# Patient Record
Sex: FEMALE | Race: BLACK OR AFRICAN AMERICAN | URBAN - METROPOLITAN AREA
[De-identification: names, ages, dates, MRNs, and addresses within clinical notes are randomized per-mention and may not be internally consistent; named-entity substitution may affect disease eponyms.]

---

## 2017-10-17 NOTE — PROGRESS NOTES
SUBJECTIVE:   CC: Concha Castanon is an 42 year old woman who presents for preventive health visit.     Healthy Habits:  Answers for HPI/ROS submitted by the patient on 10/19/2017   Annual Exam:  Getting at least 3 servings of Calcium per day:: NO  Bi-annual eye exam:: NO  Dental care twice a year:: NO  Sleep apnea or symptoms of sleep apnea:: None  Medication side effects:: None  Additional concerns today:: YES  PHQ-2 Score: 0      Patient is here for a physical.  Not due for pap, pap is due next year.    Had mammo last year, was told to have one every 2 years.  Will get next year.   Has diabetes.  Uncontrolled currently because she ran out of medications due to lack of insurance. She saw an MD yesterday (see her chart for this note) who diagnosed her with a UTI (febrile) and gave her ciprofloxacin to take. She did not start it yet so I will give her a shot of rocephin in clinic and she will start the cipro today.  She is not vomiting but has low grade fever.  No flank pain.  Her blood sugar was high yesterday over 300 but that was before she started her insulin and metformin medications again.  She is picking up her glucose monitor today and will start monitoring.  She will f/u with her PCP in one month per their note.  She will go to the emergency room with worsening fevers, vomiting, or flank pain.  She states understanding.     No FH colon or breast cancer.     Mood has been good.     No concerns today.           Today's PHQ-2 Score:   PHQ-2 ( 1999 Pf[employer]) 10/19/2017   Q1: Little interest or pleasure in doing things 0   Q2: Feeling down, depressed or hopeless 0   PHQ-2 Score 0   Q1: Little interest or pleasure in doing things Not at all   Q2: Feeling down, depressed or hopeless Not at all   PHQ-2 Score 0         Abuse: Current or Past(Physical, Sexual or Emotional)- No  Do you feel safe in your environment - Yes  Social History   Substance Use Topics     Smoking status: Never Smoker     Smokeless tobacco:  Never Used     Alcohol use Yes      Comment: OCC      The patient does not drink >3 drinks per day nor >7 drinks per week.    Reviewed orders with patient.  Reviewed health maintenance and updated orders accordingly - Yes  BP Readings from Last 3 Encounters:   10/19/17 129/87   10/18/17 118/82    Wt Readings from Last 3 Encounters:   10/19/17 198 lb (89.8 kg)   10/18/17 198 lb (89.8 kg)                  There is no problem list on file for this patient.    Past Surgical History:   Procedure Laterality Date     GYN SURGERY      Laproscopy     VAG DELIV ONLY,PREV C-SECTN      2 c section       Social History   Substance Use Topics     Smoking status: Never Smoker     Smokeless tobacco: Never Used     Alcohol use Yes      Comment: OCC      Family History   Problem Relation Age of Onset     DIABETES Mother      DIABETES Father      Hypertension Father          Current Outpatient Prescriptions   Medication Sig Dispense Refill     IRON PO        cefTRIAXone (ROCEPHIN) 1 GM vial Inject 1 g (1,000 mg) into the muscle once for 1 dose 10 mL 0     metFORMIN (GLUCOPHAGE-XR) 500 MG 24 hr tablet Take 2 tablets (1,000 mg) by mouth 2 times daily (with meals) 180 tablet 0     insulin glargine (LANTUS SOLOSTAR) 100 UNIT/ML injection Inject 37 Units Subcutaneous At Bedtime 15 mL 1     ibuprofen (ADVIL/MOTRIN) 600 MG tablet Take 1 tablet (600 mg) by mouth every 8 hours as needed for moderate pain 15 tablet 1     blood glucose monitoring (NO BRAND SPECIFIED) test strip Check blood sugar x 3 daily 100 strip 1     blood glucose monitoring (NO BRAND SPECIFIED) meter device kit Check blood sugar x 3 daily 1 kit 0     blood glucose (NO BRAND SPECIFIED) lancets standard Check blood sugar x 3 daily 100 each 3     blood glucose calibration (ONETOUCH ULTRA CONTROL) solution Use to calibrate blood glucose monitor as directed. 1 Bottle 0     blood glucose monitoring (ONE TOUCH ULTRA 2) meter device kit Use to test blood sugars 3 times daily or as  "directed. 1 kit 0           Patient under age 50, mutual decision reflected in health maintenance.        Pertinent mammograms are reviewed under the imaging tab.  History of abnormal Pap smear: YES - other categories - see link Cervical Cytology Screening Guidelines      Reviewed and updated as needed this visit by clinical staffTobacco  Allergies  Meds  Med Hx  Surg Hx  Fam Hx  Soc Hx        Reviewed and updated as needed this visit by Provider        Past Medical History:   Diagnosis Date     Diabetes (H)      Uncomplicated asthma       Past Surgical History:   Procedure Laterality Date     GYN SURGERY      Laproscopy     VAG DELIV ONLY,PREV C-SECTN      2 c section     Obstetric History     No data available          ROS:  I: NEGATIVE for worrisome rashes, moles or lesions  E: NEGATIVE for vision changes or irritation  ENT: NEGATIVE for ear, mouth and throat problems  R: NEGATIVE for significant cough or SOB  B: NEGATIVE for masses, tenderness or discharge  CV: NEGATIVE for chest pain, palpitations or peripheral edema  M: NEGATIVE for significant arthralgias or myalgia  N: NEGATIVE for weakness, dizziness or paresthesias  P: NEGATIVE for changes in mood or affect    OBJECTIVE:   /87  Pulse 115  Temp 99.8  F (37.7  C) (Tympanic)  Ht 5' 6\" (1.676 m)  Wt 198 lb (89.8 kg)  LMP 09/19/2017 (Approximate)  SpO2 100%  Breastfeeding? No  BMI 31.96 kg/m2  EXAM:  GENERAL: alert, no distress and obese  EYES: Eyes grossly normal to inspection, PERRL and conjunctivae and sclerae normal  HENT: ear canals and TM's normal, nose and mouth without ulcers or lesions  NECK: no adenopathy, no asymmetry, masses, or scars and thyroid normal to palpation  RESP: lungs clear to auscultation - no rales, rhonchi or wheezes  BREAST: normal without masses, tenderness or nipple discharge and no palpable axillary masses or adenopathy  CV: regular rate and rhythm, normal S1 S2, no S3 or S4, no murmur, click or rub, no " "peripheral edema and peripheral pulses strong  ABDOMEN: soft, nontender, no hepatosplenomegaly, no masses and bowel sounds normal  MS: no gross musculoskeletal defects noted, no edema  SKIN: no suspicious lesions or rashes  NEURO: Normal strength and tone, mentation intact and speech normal  PSYCH: mentation appears normal, affect normal/bright    ASSESSMENT/PLAN:   1. Encounter for routine adult health examination with abnormal findings      2. Complicated UTI (urinary tract infection)  Take antibiotic with food. Side effects discussed.  Call with worsening symptoms or if no improvement in 1-2 days.  Discussed signs to watch for including worsening back pain, fevers, vomiting, or malaise.  They are to go to the emergency room if these occur or be seen again in clinic immediately.  Drink plenty of fluids.  We will follow up with results of culture and change antibiotic if needed.    She was monitored in clinic for 20 min after injection, she did not have a reaction.     - cefTRIAXone (ROCEPHIN) 1 GM vial; Inject 1 g (1,000 mg) into the muscle once for 1 dose  Dispense: 10 mL; Refill: 0  - ROCEPHIN 250 MG VIAL    COUNSELING:   Reviewed preventive health counseling, as reflected in patient instructions       Regular exercise       Healthy diet/nutrition       Vision screening       Hearing screening         reports that she has never smoked. She has never used smokeless tobacco.    Estimated body mass index is 31.96 kg/(m^2) as calculated from the following:    Height as of this encounter: 5' 6\" (1.676 m).    Weight as of this encounter: 198 lb (89.8 kg).   Weight management plan: Patient was referred to their PCP to discuss a diet and exercise plan.     Patient Instructions     Preventive Health Recommendations  Female Ages 40 to 49    Yearly exam:     See your health care provider every year in order to  1. Review health changes.   2. Discuss preventive care.    3. Review your medicines if your doctor prescribed " any.      Get a Pap test every three years (unless you have an abnormal result and your provider advises testing more often).      If you get Pap tests with HPV test, you only need to test every 5 years, unless you have an abnormal result. You do not need a Pap test if your uterus was removed (hysterectomy) and you have not had cancer.      You should be tested each year for STDs (sexually transmitted diseases), if you're at risk.       Ask your doctor if you should have a mammogram.      Have a colonoscopy (test for colon cancer) if someone in your family has had colon cancer or polyps before age 50.       Have a cholesterol test every 5 years.       Have a diabetes test (fasting glucose) after age 45. If you are at risk for diabetes, you should have this test every 3 years.    Shots: Get a flu shot each year. Get a tetanus shot every 10 years.     Nutrition:     Eat at least 5 servings of fruits and vegetables each day.    Eat whole-grain bread, whole-wheat pasta and brown rice instead of white grains and rice.    Talk to your provider about Calcium and Vitamin D.     Lifestyle    Exercise at least 150 minutes a week (an average of 30 minutes a day, 5 days a week). This will help you control your weight and prevent disease.    Limit alcohol to one drink per day.    No smoking.     Wear sunscreen to prevent skin cancer.    See your dentist every six months for an exam and cleaning.        Counseling Resources:  ATP IV Guidelines  Pooled Cohorts Equation Calculator  Breast Cancer Risk Calculator  FRAX Risk Assessment  ICSI Preventive Guidelines  Dietary Guidelines for Americans, 2010  USDA's MyPlate  ASA Prophylaxis  Lung CA Screening    Alondra Perez PA-C  [facility]

## 2017-10-18 ENCOUNTER — OFFICE VISIT (OUTPATIENT)
Dept: FAMILY MEDICINE | Facility: CLINIC | Age: 42
End: 2017-10-18
Payer: COMMERCIAL

## 2017-10-18 ENCOUNTER — TELEPHONE (OUTPATIENT)
Dept: FAMILY MEDICINE | Facility: CLINIC | Age: 42
End: 2017-10-18

## 2017-10-18 VITALS
SYSTOLIC BLOOD PRESSURE: 118 MMHG | OXYGEN SATURATION: 99 % | TEMPERATURE: 102.8 F | DIASTOLIC BLOOD PRESSURE: 82 MMHG | WEIGHT: 198 LBS | HEART RATE: 127 BPM

## 2017-10-18 DIAGNOSIS — R31.9 URINARY TRACT INFECTION WITH HEMATURIA, SITE UNSPECIFIED: ICD-10-CM

## 2017-10-18 DIAGNOSIS — R07.9 ACUTE CHEST PAIN: ICD-10-CM

## 2017-10-18 DIAGNOSIS — N39.0 URINARY TRACT INFECTION WITH HEMATURIA, SITE UNSPECIFIED: ICD-10-CM

## 2017-10-18 DIAGNOSIS — R53.83 FATIGUE, UNSPECIFIED TYPE: ICD-10-CM

## 2017-10-18 DIAGNOSIS — E11.65 TYPE 2 DIABETES MELLITUS WITH HYPERGLYCEMIA, WITH LONG-TERM CURRENT USE OF INSULIN (H): Primary | ICD-10-CM

## 2017-10-18 DIAGNOSIS — R82.90 NONSPECIFIC FINDING ON EXAMINATION OF URINE: ICD-10-CM

## 2017-10-18 DIAGNOSIS — R50.9 FEVER, UNSPECIFIED FEVER CAUSE: ICD-10-CM

## 2017-10-18 DIAGNOSIS — Z79.4 TYPE 2 DIABETES MELLITUS WITH HYPERGLYCEMIA, WITH LONG-TERM CURRENT USE OF INSULIN (H): Primary | ICD-10-CM

## 2017-10-18 LAB
ALBUMIN SERPL-MCNC: 3.4 G/DL (ref 3.4–5)
ALBUMIN UR-MCNC: ABNORMAL MG/DL
ALP SERPL-CCNC: 73 U/L (ref 40–150)
ALT SERPL W P-5'-P-CCNC: 18 U/L (ref 0–50)
ANION GAP SERPL CALCULATED.3IONS-SCNC: 13 MMOL/L (ref 3–14)
APPEARANCE UR: ABNORMAL
AST SERPL W P-5'-P-CCNC: 12 U/L (ref 0–45)
BACTERIA #/AREA URNS HPF: ABNORMAL /HPF
BASOPHILS # BLD AUTO: 0 10E9/L (ref 0–0.2)
BASOPHILS NFR BLD AUTO: 0.2 %
BILIRUB SERPL-MCNC: 0.4 MG/DL (ref 0.2–1.3)
BILIRUB UR QL STRIP: NEGATIVE
BUN SERPL-MCNC: 11 MG/DL (ref 7–30)
CALCIUM SERPL-MCNC: 8.9 MG/DL (ref 8.5–10.1)
CHLORIDE SERPL-SCNC: 96 MMOL/L (ref 94–109)
CO2 SERPL-SCNC: 20 MMOL/L (ref 20–32)
COLOR UR AUTO: YELLOW
CREAT SERPL-MCNC: 0.79 MG/DL (ref 0.52–1.04)
CREAT UR-MCNC: 74 MG/DL
DIFFERENTIAL METHOD BLD: NORMAL
EOSINOPHIL # BLD AUTO: 0 10E9/L (ref 0–0.7)
EOSINOPHIL NFR BLD AUTO: 0.5 %
ERYTHROCYTE [DISTWIDTH] IN BLOOD BY AUTOMATED COUNT: 11.5 % (ref 10–15)
GFR SERPL CREATININE-BSD FRML MDRD: 81 ML/MIN/1.7M2
GLUCOSE SERPL-MCNC: 325 MG/DL (ref 70–99)
GLUCOSE UR STRIP-MCNC: >=1000 MG/DL
HBA1C MFR BLD: 13.1 % (ref 4.3–6)
HCT VFR BLD AUTO: 35.1 % (ref 35–47)
HGB BLD-MCNC: 12.8 G/DL (ref 11.7–15.7)
HGB UR QL STRIP: ABNORMAL
KETONES UR STRIP-MCNC: 40 MG/DL
LDLC SERPL DIRECT ASSAY-MCNC: 79 MG/DL
LEUKOCYTE ESTERASE UR QL STRIP: ABNORMAL
LYMPHOCYTES # BLD AUTO: 1.2 10E9/L (ref 0.8–5.3)
LYMPHOCYTES NFR BLD AUTO: 19.6 %
MCH RBC QN AUTO: 30.5 PG (ref 26.5–33)
MCHC RBC AUTO-ENTMCNC: 36.5 G/DL (ref 31.5–36.5)
MCV RBC AUTO: 84 FL (ref 78–100)
MICROALBUMIN UR-MCNC: 91 MG/L
MICROALBUMIN/CREAT UR: 122.93 MG/G CR (ref 0–25)
MONOCYTES # BLD AUTO: 0.8 10E9/L (ref 0–1.3)
MONOCYTES NFR BLD AUTO: 12.5 %
NEUTROPHILS # BLD AUTO: 4.2 10E9/L (ref 1.6–8.3)
NEUTROPHILS NFR BLD AUTO: 67.2 %
NITRATE UR QL: POSITIVE
NON-SQ EPI CELLS #/AREA URNS LPF: ABNORMAL /LPF
PH UR STRIP: 6 PH (ref 5–7)
PLATELET # BLD AUTO: 216 10E9/L (ref 150–450)
POTASSIUM SERPL-SCNC: 3.9 MMOL/L (ref 3.4–5.3)
PROT SERPL-MCNC: 8.5 G/DL (ref 6.8–8.8)
RBC # BLD AUTO: 4.19 10E12/L (ref 3.8–5.2)
RBC #/AREA URNS AUTO: ABNORMAL /HPF
SODIUM SERPL-SCNC: 129 MMOL/L (ref 133–144)
SOURCE: ABNORMAL
SP GR UR STRIP: <=1.005 (ref 1–1.03)
UROBILINOGEN UR STRIP-ACNC: 0.2 EU/DL (ref 0.2–1)
WBC # BLD AUTO: 6.2 10E9/L (ref 4–11)
WBC #/AREA URNS AUTO: ABNORMAL /HPF

## 2017-10-18 PROCEDURE — 83036 HEMOGLOBIN GLYCOSYLATED A1C: CPT | Performed by: FAMILY MEDICINE

## 2017-10-18 PROCEDURE — 82043 UR ALBUMIN QUANTITATIVE: CPT | Performed by: FAMILY MEDICINE

## 2017-10-18 PROCEDURE — 99214 OFFICE O/P EST MOD 30 MIN: CPT | Performed by: FAMILY MEDICINE

## 2017-10-18 PROCEDURE — 87088 URINE BACTERIA CULTURE: CPT | Performed by: FAMILY MEDICINE

## 2017-10-18 PROCEDURE — 80053 COMPREHEN METABOLIC PANEL: CPT | Performed by: FAMILY MEDICINE

## 2017-10-18 PROCEDURE — 87086 URINE CULTURE/COLONY COUNT: CPT | Performed by: FAMILY MEDICINE

## 2017-10-18 PROCEDURE — 85025 COMPLETE CBC W/AUTO DIFF WBC: CPT | Performed by: FAMILY MEDICINE

## 2017-10-18 PROCEDURE — 93000 ELECTROCARDIOGRAM COMPLETE: CPT | Performed by: FAMILY MEDICINE

## 2017-10-18 PROCEDURE — 36415 COLL VENOUS BLD VENIPUNCTURE: CPT | Performed by: FAMILY MEDICINE

## 2017-10-18 PROCEDURE — 87186 SC STD MICRODIL/AGAR DIL: CPT | Performed by: FAMILY MEDICINE

## 2017-10-18 PROCEDURE — 81001 URINALYSIS AUTO W/SCOPE: CPT | Performed by: FAMILY MEDICINE

## 2017-10-18 PROCEDURE — 83721 ASSAY OF BLOOD LIPOPROTEIN: CPT | Performed by: FAMILY MEDICINE

## 2017-10-18 RX ORDER — METFORMIN HCL 500 MG
1000 TABLET, EXTENDED RELEASE 24 HR ORAL 2 TIMES DAILY WITH MEALS
Qty: 180 TABLET | Refills: 0 | Status: SHIPPED | OUTPATIENT
Start: 2017-10-18 | End: 2017-11-16

## 2017-10-18 RX ORDER — IBUPROFEN 600 MG/1
600 TABLET, FILM COATED ORAL EVERY 8 HOURS PRN
Qty: 15 TABLET | Refills: 1 | Status: SHIPPED | OUTPATIENT
Start: 2017-10-18

## 2017-10-18 RX ORDER — BLOOD-GLUCOSE METER
EACH MISCELLANEOUS
Qty: 1 KIT | Refills: 0 | Status: SHIPPED | OUTPATIENT
Start: 2017-10-18 | End: 2017-11-30 | Stop reason: ALTCHOICE

## 2017-10-18 RX ORDER — CIPROFLOXACIN 500 MG/1
500 TABLET, FILM COATED ORAL 2 TIMES DAILY
Qty: 14 TABLET | Refills: 0 | Status: SHIPPED | OUTPATIENT
Start: 2017-10-18 | End: 2017-10-19

## 2017-10-18 NOTE — PATIENT INSTRUCTIONS
Diabetes Goals;  1. A1c below 7  2. Before breakfast:  Less 120  3. Bedtime                 Less 160  4. 2 hrs after meal:  Less  160  5. Follow up 1 month.    [facility]    If you have any questions regarding to your visit please contact your care team:       Team Purple:   Clinic Hours Telephone Number   Dr. Breonna Lombardo   7am-7pm  Monday - Thursday   7am-5pm  Fridays  [phone]  (Appointment scheduling available 24/7)    Questions about your Visit?   Team Line:  [phone]   Urgent Care - [place] - 11am-9pm Monday-Friday Saturday-Sunday- 9am-5pm   [place] - 5pm-9pm Monday-Friday Saturday-Sunday- 9am-5pm  [phone] - [place]   [phone] - [place]       What options do I have for visits at the clinic other than the traditional office visit?  To expand how we care for you, many of our providers are utilizing electronic visits (e-visits) and telephone visits, when medically appropriate, for interactions with their patients rather than a visit in the clinic.   We also offer nurse visits for many medical concerns. Just like any other service, we will bill your insurance company for this type of visit based on time spent on the phone with your provider. Not all insurance companies cover these visits. Please check with your medical insurance if this type of visit is covered. You will be responsible for any charges that are not paid by your insurance.      E-visits via [employer]:  generally incur a $35.00 fee.  Telephone visits:  Time spent on the phone: *charged based on time that is spent on the phone in increments of 10 minutes. Estimated cost:   5-10 mins $30.00   11-20 mins. $59.00   21-30 mins. $85.00     Use [employer] (secure email communication and access to your chart) to send your primary care provider a message or make an appointment. Ask someone on your Team how to sign up for [employer].  For  a Price Quote for your services, please call our Consumer Price Line at [phone].  As always, Thank you for trusting us with your health care needs!    Discharged By: Rula

## 2017-10-18 NOTE — NURSING NOTE
Verbal order from Dr. Pina to give:    The following medication was given :     MEDICATION: Acetaminophen 325mg  ROUTE: PO  Time Give: 1:32PM  DOSE: 2 tablets  LOT #: [id number]  :  [facility]  EXPIRATION DATE:  01/2019  NDC#: 94858-266-82  Rula Wong MA

## 2017-10-18 NOTE — PROGRESS NOTES
SUBJECTIVE:   Concha Castanon is a 42 year old female who presents to clinic today for the following health issues:     1. General Weaknesss and Body aches       Started after eating tuna 4 days ago and started having pain in the stomach; felt like gas, then body aches and shakes.      Has no cough.    2. Chest Pain     Started experiencing chest pain today.    3. Diabetes, type 2.    Not had insurance for about 1 year and not been on medications.    Feels thirsty, constantly peeing, feeling hungry alot.  vision is good     She was on Invokana; 100 mg, Lantus 37 units at bedtime, Metformin 100 mg twice daily, Ferrous Sulphate and Albuterol       Patient is checking blood sugars: not at all    Diabetic concerns: None and other - no had medications or checked her blood sugars for almost a year     Symptoms of hypoglycemia (low blood sugar): none     Paresthesias (numbness or burning in feet) or sores: No     Date of last diabetic eye exam: Been over 1 year      Problem list and histories reviewed & adjusted, as indicated.  Additional history: as documented    There is no problem list on file for this patient.    No past surgical history on file.    Social History   Substance Use Topics     Smoking status: Not on file     Smokeless tobacco: Not on file     Alcohol use Not on file     No family history on file.      Family History    Medical History Relation Name Comments   Good Health Brother       Good Health Daughter       Diabetes Father       Hyperlipidemia Father       Hypertension Father       Stroke Father       Thyroid Disease Mother   growth removed   Good Health Son           Reviewed and updated as needed this visit by clinical staff     Reviewed and updated as needed this visit by Provider       ROS:  HEENT, cardiovascular, pulmonary, gi and gu systems are negative, except as otherwise noted.      OBJECTIVE:   /82  Pulse 127  Temp 102.8  F (39.3  C)  Wt 198 lb (89.8 kg)  SpO2 99%  There is no  height or weight on file to calculate BMI.  GENERAL: Weak and sick looking, alert and no distress  NECK: no adenopathy and thyroid normal to palpation  RESP: lungs clear to auscultation - no rales, rhonchi or wheezes  CV: regular rate and rhythm, normal S1 S2, no S3 or S4, no murmur, click or rub, no peripheral edema   ABDOMEN: soft, nontender, no hepatosplenomegaly, no masses and bowel sounds normal  MS: no gross musculoskeletal defects noted, no edema    Diagnostic Test Results:  Results for orders placed or performed in visit on 10/18/17   Hemoglobin A1c   Result Value Ref Range    Hemoglobin A1C 13.1 (H) 4.3 - 6.0 %   UA reflex to Microscopic and Culture   Result Value Ref Range    Color Urine Yellow     Appearance Urine Slightly Cloudy     Glucose Urine >=1000 (A) NEG^Negative mg/dL    Bilirubin Urine Negative NEG^Negative    Ketones Urine 40 (A) NEG^Negative mg/dL    Specific Gravity Urine <=1.005 1.003 - 1.035    Blood Urine Large (A) NEG^Negative    pH Urine 6.0 5.0 - 7.0 pH    Protein Albumin Urine Trace (A) NEG^Negative mg/dL    Urobilinogen Urine 0.2 0.2 - 1.0 EU/dL    Nitrite Urine Positive (A) NEG^Negative    Leukocyte Esterase Urine Small (A) NEG^Negative    Source Midstream Urine    CBC with platelets differential   Result Value Ref Range    WBC 6.2 4.0 - 11.0 10e9/L    RBC Count 4.19 3.8 - 5.2 10e12/L    Hemoglobin 12.8 11.7 - 15.7 g/dL    Hematocrit 35.1 35.0 - 47.0 %    MCV 84 78 - 100 fl    MCH 30.5 26.5 - 33.0 pg    MCHC 36.5 31.5 - 36.5 g/dL    RDW 11.5 10.0 - 15.0 %    Platelet Count 216 150 - 450 10e9/L    Diff Method Automated Method     % Neutrophils 67.2 %    % Lymphocytes 19.6 %    % Monocytes 12.5 %    % Eosinophils 0.5 %    % Basophils 0.2 %    Absolute Neutrophil 4.2 1.6 - 8.3 10e9/L    Absolute Lymphocytes 1.2 0.8 - 5.3 10e9/L    Absolute Monocytes 0.8 0.0 - 1.3 10e9/L    Absolute Eosinophils 0.0 0.0 - 0.7 10e9/L    Absolute Basophils 0.0 0.0 - 0.2 10e9/L   Urine Microscopic   Result  Value Ref Range    WBC Urine  (A) OTO2^O - 2 /HPF    RBC Urine  (A) OTO2^O - 2 /HPF    Squamous Epithelial /LPF Urine Few FEW^Few /LPF    Bacteria Urine Moderate (A) NEG^Negative /HPF       ASSESSMENT/PLAN:     (E11.65,  Z79.4) Type 2 diabetes mellitus with hyperglycemia, with long-term current use of insulin (H)  (primary encounter diagnosis)  Comment:  Went over the nature of diabetes and its complications.  Went over co morbidities, need for good blood sugar control as well as BP and Cholesterol control.  Discussed the recheck schedule  Plan: Hemoglobin A1c, Comprehensive metabolic panel,         Albumin Random Urine Quantitative with Creat         Ratio, LDL cholesterol direct, metFORMIN         (GLUCOPHAGE-XR) 500 MG 24 hr tablet, insulin         glargine (LANTUS SOLOSTAR) 100 UNIT/ML         injection, ibuprofen (ADVIL/MOTRIN) 600 MG         tablet, blood glucose monitoring (NO BRAND         SPECIFIED) test strip, blood glucose monitoring        (NO BRAND SPECIFIED) meter device kit, blood         glucose (NO BRAND SPECIFIED) lancets standard,         DIABETES EDUCATOR REFERRAL, Urine Microscopic,         blood glucose calibration (ONETOUCH ULTRA         CONTROL) solution, blood glucose monitoring         (ONE TOUCH ULTRA 2) meter device kit    (R07.9) Acute chest pain  Comment: EKG is normal.  Plan: EKG 12-lead complete w/read - Clinics    (R50.9) Fever, unspecified fever cause  Comment: UA consistent with UTI  Plan: UA reflex to Microscopic and Culture, CBC with         platelets differential    (R53.83) Fatigue, unspecified type  Comment: Related to uncontrolled diabetes, UTI and fever.  Plan: CBC with platelets differential    (R82.90) Nonspecific finding on examination of urine  Plan: Urine Culture Aerobic Bacterial    (N39.0,  R31.9) Urinary tract infection with hematuria, site unspecified  Comment: Since concern for pylero, will do Cipro  Plan: ciprofloxacin (CIPRO) 500 MG tablet    Follow  up in 1 month or sooner with concerns    Daniel Pina MD  [facility] [place]

## 2017-10-18 NOTE — TELEPHONE ENCOUNTER
Pharmacy dispensed a new blood glucose meter-one touch ultra 2, she will need an rx for control solution. Please send to [facility] in [place]  Thank you  Awa Abrams [facility]  Phone [phone]  Fax      [phone]

## 2017-10-18 NOTE — MR AVS SNAPSHOT
After Visit Summary   10/18/2017    Concha Castanon    MRN: [id number]           Patient Information     Date Of Birth          1975        Visit Information        Provider Department      10/18/2017 12:00 PM Daniel Pina MD [facility]        Today's Diagnoses     Type 2 diabetes mellitus with hyperglycemia, with long-term current use of insulin (H)    -  1    Chest pain        Fever, unspecified fever cause        Fatigue, unspecified type          Care Instructions    Diabetes Goals;  1. A1c below 7  2. Before breakfast:  Less 120  3. Bedtime                 Less 160  4. 2 hrs after meal:  Less  160  5. Follow up 1 month.    [facility]    If you have any questions regarding to your visit please contact your care team:       Team Purple:   Clinic Hours Telephone Number   Dr. Breonna Lombardo   7am-7pm  Monday - Thursday   7am-5pm  Fridays  [phone]  (Appointment scheduling available 24/7)    Questions about your Visit?   Team Line:  [phone]   Urgent Care - [place] [facility] - 11am-9pm Monday-Friday Saturday-Sunday- 9am-5pm   [place] - 5pm-9pm Monday-Friday Saturday-Sunday- 9am-5pm  [phone] - [place]   [phone] - [place]       What options do I have for visits at the clinic other than the traditional office visit?  To expand how we care for you, many of our providers are utilizing electronic visits (e-visits) and telephone visits, when medically appropriate, for interactions with their patients rather than a visit in the clinic.   We also offer nurse visits for many medical concerns. Just like any other service, we will bill your insurance company for this type of visit based on time spent on the phone with your provider. Not all insurance companies cover these visits. Please check with your medical insurance if this type of visit is covered. You will be  responsible for any charges that are not paid by your insurance.      E-visits via IGGhart:  generally incur a $35.00 fee.  Telephone visits:  Time spent on the phone: *charged based on time that is spent on the phone in increments of 10 minutes. Estimated cost:   5-10 mins $30.00   11-20 mins. $59.00   21-30 mins. $85.00     Use Notcht (secure email communication and access to your chart) to send your primary care provider a message or make an appointment. Ask someone on your Team how to sign up for [employer].  For a Price Quote for your services, please call our hipix Line at [phone].  As always, Thank you for trusting us with your health care needs!    Discharged By: An            Follow-ups after your visit        Additional Services     DIABETES EDUCATOR REFERRAL       DIABETES SELF MANAGEMENT TRAINING (DSMT)      Your provider has referred you to Diabetes Education: FMG: Diabetes Education - All [facility] [phone]   [email or web address]     If an urgent visit is needed or A1C is above 12, Care Team to call the Diabetes  Education Team at [phone] or send an In Basket message to the Diabetes Education Pool (P DIAB ED-PATIENT CARE).    A  will call you to make your appointment. If it has been more than 3 business days since your referral was placed, please call the above phone number to schedule.    Type of training and number of hours: Previous Diagnosis: Follow-up DSMT - 2 hours.    Medicare covers: 10 hours of initial DSMT in 12 month period from the time of first visit, plus 2 hours of follow-up DSMT annually, and additional hours as requested for insulin training.    Diabetes Type: Type 2 - On Insulin             Diabetes Co-Morbidities: none               A1C Goal:  <8.0       A1C is: Lab Results       Component                Value               Date                       A1C                      13.1                 10/18/2017              Diabetes Education Topics: Comprehensive Knowledge Assessment and Instruction    Special Educational Needs Requiring Individual DSMT: None       MEDICAL NUTRITION THERAPY (MNT) for Diabetes    Medical Nutrition Therapy with a Registered Dietitian can be provided in coordination with Diabetes Self-Management Training to assist in achieving optimal diabetes management.    MNT Type and Hours: Previous diagnosis: Annual follow-up MNT - 2 hours                       Medicare will cover: 3 hours initial MNT in 12 month period after first visit, plus 2 hours of follow-up MNT annually    Please be aware that coverage of these services is subject to the terms and limitations of your health insurance plan.  Call member services at your health plan to determine Diabetes Self-Management Training (Codes  &amp; ) and Medical Nutrition Therapy (Codes 98049 & 57609) benefits and ask which blood glucose monitor brands are covered by your plan.  Please bring the following with you to your appointment:    (1)  List of current medications   (2)  List of Blood Glucose Monitor brands that are covered by your insurance plan  (3)  Blood Glucose Monitor and log book  (4)   Food records for the 3 days prior to your visit    The Certified Diabetes Educator may make diabetes medication adjustments per the CDE Protocol and Collaborative Practice Agreement.                  Your next 10 appointments already scheduled     Oct 19, 2017 11:20 AM CDT   PHYSICAL with Alondra Perez PA-C   [facility]    [address] [facility] [address] [phone]              Who to contact     If you have questions or need follow up information about today's clinic visit or your schedule please contact [facility] directly at [phone].  Normal or non-critical lab and imaging results will be communicated to you by MyChart, letter or phone within 4  "business days after the clinic has received the results. If you do not hear from us within 7 days, please contact the clinic through CaptiveMotion or phone. If you have a critical or abnormal lab result, we will notify you by phone as soon as possible.  Submit refill requests through Captive[employer] or call your pharmacy and they will forward the refill request to us. Please allow 3 business days for your refill to be completed.          Additional Information About Your Visit        CaptiveMotion Information     CaptiveMotion lets you send messages to your doctor, view your test results, renew your prescriptions, schedule appointments and more. To sign up, go to [email or web address] . Click on \"Log in\" on the left side of the screen, which will take you to the Welcome page. Then click on \"Sign up Now\" on the right side of the page.     You will be asked to enter the access code listed below, as well as some personal information. Please follow the directions to create your username and password.     Your access code is: [email or web address]  Expires: 2018  2:12 PM     Your access code will  in 90 days. If you need help or a new code, please call your [place] clinic or [phone].        Care EveryWhere ID     This is your Care EveryWhere ID. This could be used by other organizations to access your [place] medical records  [id number]        Your Vitals Were     Pulse Temperature Pulse Oximetry             127 102.8  F (39.3  C) 99%          Blood Pressure from Last 3 Encounters:   10/18/17 118/82    Weight from Last 3 Encounters:   10/18/17 198 lb (89.8 kg)              We Performed the Following     Albumin Random Urine Quantitative with Creat Ratio     CBC with platelets differential     Comprehensive metabolic panel     DIABETES EDUCATOR REFERRAL     EKG 12-lead complete w/read - Clinics     Hemoglobin A1c     LDL cholesterol direct     UA reflex to Microscopic and Culture          Today's Medication Changes          These " changes are accurate as of: 10/18/17  2:12 PM.  If you have any questions, ask your nurse or doctor.               Start taking these medicines.        Dose/Directions    blood glucose lancets standard   Commonly known as:  no brand specified   Used for:  Type 2 diabetes mellitus with hyperglycemia, with long-term current use of insulin (H)        Check blood sugar x 3 daily   Quantity:  100 each   Refills:  3       blood glucose monitoring meter device kit   Commonly known as:  no brand specified   Used for:  Type 2 diabetes mellitus with hyperglycemia, with long-term current use of insulin (H)        Check blood sugar x 3 daily   Quantity:  1 kit   Refills:  0       blood glucose monitoring test strip   Commonly known as:  no brand specified   Used for:  Type 2 diabetes mellitus with hyperglycemia, with long-term current use of insulin (H)        Check blood sugar x 3 daily   Quantity:  100 strip   Refills:  1       ibuprofen 600 MG tablet   Commonly known as:  ADVIL/MOTRIN   Used for:  Type 2 diabetes mellitus with hyperglycemia, with long-term current use of insulin (H)        Dose:  600 mg   Take 1 tablet (600 mg) by mouth every 8 hours as needed for moderate pain   Quantity:  15 tablet   Refills:  1       insulin glargine 100 UNIT/ML injection   Commonly known as:  LANTUS SOLOSTAR   Used for:  Type 2 diabetes mellitus with hyperglycemia, with long-term current use of insulin (H)        Dose:  37 Units   Inject 37 Units Subcutaneous At Bedtime   Quantity:  15 mL   Refills:  1       metFORMIN 500 MG 24 hr tablet   Commonly known as:  GLUCOPHAGE-XR   Used for:  Type 2 diabetes mellitus with hyperglycemia, with long-term current use of insulin (H)        Dose:  1000 mg   Take 2 tablets (1,000 mg) by mouth 2 times daily (with meals)   Quantity:  180 tablet   Refills:  0            Where to get your medicines      These medications were sent to [facility] - [address]  [facility] [address], [facility] [address]     Phone:  [phone]     blood glucose lancets standard    blood glucose monitoring meter device kit    blood glucose monitoring test strip    ibuprofen 600 MG tablet    insulin glargine 100 UNIT/ML injection    metFORMIN 500 MG 24 hr tablet                Primary Care Provider Office Phone # Fax #    Daniel Pina [phone] [address] [facility] [address]        Equal Access to Services     RUDOLPH ORTIZ : Hadii aad ku hadasho Soomaali, waaxda luqadaha, qaybta kaalmada adeegyada, waxay idiin hayaan adeeg kharash la'aan ah. So [facility] [phone].    ATENCIÓN: Si habla español, tiene a easton disposición servicios gratuitos de asistencia lingüística. [facility] [phone].    We comply with applicable federal civil rights laws and [place] laws. We do not discriminate on the basis of race, color, national origin, age, disability, sex, sexual orientation, or gender identity.            Thank you!     Thank you for choosing [facility]  for your care. Our goal is always to provide you with excellent care. Hearing back from our patients is one way we can continue to improve our services. Please take a few minutes to complete the written survey that you may receive in the mail after your visit with us. Thank you!             Your Updated Medication List - Protect others around you: Learn how to safely use, store and throw away your medicines at www.disposemymeds.org.          This list is accurate as of: 10/18/17  2:12 PM.  Always use your most recent med list.                   Brand Name Dispense Instructions for use Diagnosis    blood glucose lancets standard    no brand specified    100 each    Check blood sugar x 3 daily    Type 2 diabetes mellitus with hyperglycemia, with long-term current use of insulin (H)       blood glucose monitoring meter device kit    no brand specified    1 kit    Check blood sugar x 3 daily     Type 2 diabetes mellitus with hyperglycemia, with long-term current use of insulin (H)       blood glucose monitoring test strip    no brand specified    100 strip    Check blood sugar x 3 daily    Type 2 diabetes mellitus with hyperglycemia, with long-term current use of insulin (H)       ibuprofen 600 MG tablet    ADVIL/MOTRIN    15 tablet    Take 1 tablet (600 mg) by mouth every 8 hours as needed for moderate pain    Type 2 diabetes mellitus with hyperglycemia, with long-term current use of insulin (H)       insulin glargine 100 UNIT/ML injection    LANTUS SOLOSTAR    15 mL    Inject 37 Units Subcutaneous At Bedtime    Type 2 diabetes mellitus with hyperglycemia, with long-term current use of insulin (H)       metFORMIN 500 MG 24 hr tablet    GLUCOPHAGE-XR    180 tablet    Take 2 tablets (1,000 mg) by mouth 2 times daily (with meals)    Type 2 diabetes mellitus with hyperglycemia, with long-term current use of insulin (H)

## 2017-10-19 ENCOUNTER — OFFICE VISIT (OUTPATIENT)
Dept: FAMILY MEDICINE | Facility: CLINIC | Age: 42
End: 2017-10-19
Payer: COMMERCIAL

## 2017-10-19 ENCOUNTER — TELEPHONE (OUTPATIENT)
Dept: FAMILY MEDICINE | Facility: CLINIC | Age: 42
End: 2017-10-19

## 2017-10-19 VITALS
OXYGEN SATURATION: 100 % | SYSTOLIC BLOOD PRESSURE: 129 MMHG | DIASTOLIC BLOOD PRESSURE: 87 MMHG | HEART RATE: 97 BPM | TEMPERATURE: 99.8 F | WEIGHT: 198 LBS | BODY MASS INDEX: 31.82 KG/M2 | HEIGHT: 66 IN

## 2017-10-19 DIAGNOSIS — Z00.01 ENCOUNTER FOR ROUTINE ADULT HEALTH EXAMINATION WITH ABNORMAL FINDINGS: Primary | ICD-10-CM

## 2017-10-19 DIAGNOSIS — N39.0 COMPLICATED UTI (URINARY TRACT INFECTION): ICD-10-CM

## 2017-10-19 PROCEDURE — 99396 PREV VISIT EST AGE 40-64: CPT | Mod: 25 | Performed by: PHYSICIAN ASSISTANT

## 2017-10-19 PROCEDURE — 99213 OFFICE O/P EST LOW 20 MIN: CPT | Mod: 25 | Performed by: PHYSICIAN ASSISTANT

## 2017-10-19 PROCEDURE — 96372 THER/PROPH/DIAG INJ SC/IM: CPT | Performed by: PHYSICIAN ASSISTANT

## 2017-10-19 RX ORDER — CEFTRIAXONE 1 G/1
1000 INJECTION, POWDER, FOR SOLUTION INTRAMUSCULAR; INTRAVENOUS ONCE
Qty: 10 ML | Refills: 0 | OUTPATIENT
Start: 2017-10-19 | End: 2017-10-19

## 2017-10-19 NOTE — TELEPHONE ENCOUNTER
Diabetes Education Scheduling Outreach #1:    Call to patient to schedule. Left message with phone number to call to schedule.    Plan for 2nd outreach attempt within 1 week.    Vincenzo [facility]  Diabetes and Nutrition Scheduling

## 2017-10-19 NOTE — MR AVS SNAPSHOT
After Visit Summary   10/19/2017    Concha Castanon    MRN: [id number]           Patient Information     Date Of Birth          1975        Visit Information        Provider Department      10/19/2017 11:20 AM Alondra Perez PA-C [facility]        Today's Diagnoses     Encounter for routine adult health examination with abnormal findings    -  1    Complicated UTI (urinary tract infection)          Care Instructions      Preventive Health Recommendations  Female Ages 40 to 49    Yearly exam:     See your health care provider every year in order to  1. Review health changes.   2. Discuss preventive care.    3. Review your medicines if your doctor prescribed any.      Get a Pap test every three years (unless you have an abnormal result and your provider advises testing more often).      If you get Pap tests with HPV test, you only need to test every 5 years, unless you have an abnormal result. You do not need a Pap test if your uterus was removed (hysterectomy) and you have not had cancer.      You should be tested each year for STDs (sexually transmitted diseases), if you're at risk.       Ask your doctor if you should have a mammogram.      Have a colonoscopy (test for colon cancer) if someone in your family has had colon cancer or polyps before age 50.       Have a cholesterol test every 5 years.       Have a diabetes test (fasting glucose) after age 45. If you are at risk for diabetes, you should have this test every 3 years.    Shots: Get a flu shot each year. Get a tetanus shot every 10 years.     Nutrition:     Eat at least 5 servings of fruits and vegetables each day.    Eat whole-grain bread, whole-wheat pasta and brown rice instead of white grains and rice.    Talk to your provider about Calcium and Vitamin D.     Lifestyle    Exercise at least 150 minutes a week (an average of 30 minutes a day, 5 days a week). This will help you control your weight and prevent  "disease.    Limit alcohol to one drink per day.    No smoking.     Wear sunscreen to prevent skin cancer.    See your dentist every six months for an exam and cleaning.          Follow-ups after your visit        Your next 10 appointments already scheduled     Nov 07, 2017  9:40 AM CST   [facility]ida Leon [facility]   [facility]    [address] [facility] [address] [phone]            Nov 20, 2017 10:00 AM CST   Office Visit with Daniel Pina MD   [facility]    [address] [facility] [address] [facility] [address] [phone]           Bring a current list of meds and any records pertaining to this visit. For Physicals, please bring immunization records and any forms needing to be filled out. Please arrive 10 minutes early to complete paperwork.              Who to contact     If you have questions or need follow up information about today's clinic visit or your schedule please contact [facility] directly at [phone].  Normal or non-critical lab and imaging results will be communicated to you by MyChart, letter or phone within 4 business days after the clinic has received the results. If you do not hear from us within 7 days, please contact the clinic through MyChart or phone. If you have a critical or abnormal lab result, we will notify you by phone as soon as possible.  Submit refill requests through [employer] or call your pharmacy and they will forward the refill request to us. Please allow 3 business days for your refill to be completed.          Additional Information About Your Visit        Care EveryWhere ID     This is your Care EveryWhere ID. This could be used by other organizations to access your [place] medical records  [id number]        Your Vitals Were     Pulse Temperature Height Last Period Pulse Oximetry Breastfeeding?    115 99.8  F (37.7  C) (Tympanic) 5' 6\" (1.676 m) " 09/19/2017 (Approximate) 100% No    BMI (Body Mass Index)                   31.96 kg/m2            Blood Pressure from Last 3 Encounters:   10/19/17 129/87   10/18/17 118/82    Weight from Last 3 Encounters:   10/19/17 198 lb (89.8 kg)   10/18/17 198 lb (89.8 kg)              We Performed the Following     ROCEPHIN 250 MG VIAL          Today's Medication Changes          These changes are accurate as of: 10/19/17 12:16 PM.  If you have any questions, ask your nurse or doctor.               Start taking these medicines.        Dose/Directions    cefTRIAXone 1 GM vial   Commonly known as:  ROCEPHIN   Used for:  Complicated UTI (urinary tract infection)   Started by:  Alondra Perez PA-C        Dose:  1000 mg   Inject 1 g (1,000 mg) into the muscle once for 1 dose   Quantity:  10 mL   Refills:  0            Where to get your medicines      Some of these will need a paper prescription and others can be bought over the counter.  Ask your nurse if you have questions.     You don't need a prescription for these medications     cefTRIAXone 1 GM vial                Primary Care Provider Office Phone # Fax #    Daniel Pina [phone]       [address] [place] [address]        Equal Access to Services     JEREMY ORTIZ AH: Hadii ramesh king hadasho Somargaretali, waaxda luqadaha, qaybta kaalmada adeegyada, shaista penn. So [facility] [phone].    ATENCIÓN: Si habla español, tiene a easton disposición servicios gratuitos de asistencia lingüística. Llame al [phone].    We comply with applicable federal civil rights laws and [place] laws. We do not discriminate on the basis of race, color, national origin, age, disability, sex, sexual orientation, or gender identity.            Thank you!     Thank you for choosing [facility]  for your care. Our goal is always to provide you with excellent care. Hearing back from our patients is one way we can  continue to improve our services. Please take a few minutes to complete the written survey that you may receive in the mail after your visit with us. Thank you!             Your Updated Medication List - Protect others around you: Learn how to safely use, store and throw away your medicines at www.disposemymeds.org.          This list is accurate as of: 10/19/17 12:16 PM.  Always use your most recent med list.                   Brand Name Dispense Instructions for use Diagnosis    blood glucose calibration solution     1 Bottle    Use to calibrate blood glucose monitor as directed.    Type 2 diabetes mellitus with hyperglycemia, with long-term current use of insulin (H)       blood glucose lancets standard    no brand specified    100 each    Check blood sugar x 3 daily    Type 2 diabetes mellitus with hyperglycemia, with long-term current use of insulin (H)       * blood glucose monitoring meter device kit    no brand specified    1 kit    Check blood sugar x 3 daily    Type 2 diabetes mellitus with hyperglycemia, with long-term current use of insulin (H)       * blood glucose monitoring meter device kit     1 kit    Use to test blood sugars 3 times daily or as directed.    Type 2 diabetes mellitus with hyperglycemia, with long-term current use of insulin (H)       blood glucose monitoring test strip    no brand specified    100 strip    Check blood sugar x 3 daily    Type 2 diabetes mellitus with hyperglycemia, with long-term current use of insulin (H)       cefTRIAXone 1 GM vial    ROCEPHIN    10 mL    Inject 1 g (1,000 mg) into the muscle once for 1 dose    Complicated UTI (urinary tract infection)       ibuprofen 600 MG tablet    ADVIL/MOTRIN    15 tablet    Take 1 tablet (600 mg) by mouth every 8 hours as needed for moderate pain    Type 2 diabetes mellitus with hyperglycemia, with long-term current use of insulin (H)       insulin glargine 100 UNIT/ML injection    LANTUS SOLOSTAR    15 mL    Inject 37 Units  Subcutaneous At Bedtime    Type 2 diabetes mellitus with hyperglycemia, with long-term current use of insulin (H)       IRON PO           metFORMIN 500 MG 24 hr tablet    GLUCOPHAGE-XR    180 tablet    Take 2 tablets (1,000 mg) by mouth 2 times daily (with meals)    Type 2 diabetes mellitus with hyperglycemia, with long-term current use of insulin (H)       * Notice:  This list has 2 medication(s) that are the same as other medications prescribed for you. Read the directions carefully, and ask your doctor or other care provider to review them with you.

## 2017-10-19 NOTE — NURSING NOTE
"Chief Complaint   Patient presents with     Physical     AFE, Pt already had labs done?       Initial /87  Pulse 115  Temp 99.8  F (37.7  C) (Tympanic)  Ht 5' 6\" (1.676 m)  Wt 198 lb (89.8 kg)  LMP 09/19/2017 (Approximate)  SpO2 100%  Breastfeeding? No  BMI 31.96 kg/m2 Estimated body mass index is 31.96 kg/(m^2) as calculated from the following:    Height as of this encounter: 5' 6\" (1.676 m).    Weight as of this encounter: 198 lb (89.8 kg).  Medication Reconciliation: complete      Judith Wong MA    "

## 2017-10-19 NOTE — PROGRESS NOTES
The following medication was given:     MEDICATION: Rocephin 1000mg and Lidocaine 2.1ML 1% without epi cc  ROUTE: IM  SITE: RUQ - Gluteus  DOSE: 2.1ML  LOT #: [id number]  :  Amirah  EXPIRATION DATE:  02/01/2020  NDC#: 7958-1552-93  Pt was told to wait in clinic for 15-20 mins after shot was given. Judith Wong MA

## 2017-10-21 LAB
BACTERIA SPEC CULT: ABNORMAL
BACTERIA SPEC CULT: ABNORMAL
SPECIMEN SOURCE: ABNORMAL

## 2017-10-31 ENCOUNTER — ALLIED HEALTH/NURSE VISIT (OUTPATIENT)
Dept: EDUCATION SERVICES | Facility: CLINIC | Age: 42
End: 2017-10-31
Payer: COMMERCIAL

## 2017-10-31 DIAGNOSIS — E11.9 DIABETES MELLITUS, TYPE 2 (H): Primary | ICD-10-CM

## 2017-10-31 DIAGNOSIS — E11.9 DIABETES MELLITUS WITHOUT COMPLICATION (H): ICD-10-CM

## 2017-10-31 PROCEDURE — G0108 DIAB MANAGE TRN  PER INDIV: HCPCS

## 2017-10-31 RX ORDER — LIRAGLUTIDE 6 MG/ML
1.2 INJECTION SUBCUTANEOUS DAILY
Qty: 6 ML | Refills: 1 | Status: SHIPPED | OUTPATIENT
Start: 2017-10-31 | End: 2017-11-20 | Stop reason: ALTCHOICE

## 2017-10-31 NOTE — TELEPHONE ENCOUNTER
Perfect it will be Trulicity.  She is amazing.    Pebbles Mehta RN/SEN  [facility] Educ[facility]

## 2017-10-31 NOTE — PROGRESS NOTES
Noted that Alondra Perez did approve of the GLP-1, I did call Concha to let her know and she will be on Trulicity 0.75 mg weekly cost of 4.00 per month.  She will pick this up tomorrow at [place] pharmacy.    Pebbles Mehta RN/SUELLENE  [place] Diabetes Educator

## 2017-10-31 NOTE — MR AVS SNAPSHOT
After Visit Summary   10/31/2017    Concha Castanon    MRN: [id number]           Patient Information     Date Of Birth          1975        Visit Information        Provider Department      10/31/2017 9:30 AM AN DIABETIC ED RESOURCE [facility]        Today's Diagnoses     Diabetes mellitus, type 2 (H)    -  1      Care Instructions    My Diabetes Care Goals:    Healthy Eating: 3 meals per day, every 4- 5 hrs apart and proteins all meals and 1-2 snacks each day. 3-4 carb choices with meals.      Being Active: continue at the gym    Monitoring: check every morning 80-13 and then 2 hrs after a meal.  Goal is < 180    Taking Medication: Metformin  mg take 2 tabs with breakfast and 2 tabs with dinner  Lantus 37 units  If we are to start Victoza 0.6 mg X 7 days then increase to 1.2 mg on day and stay there.        Follow up:  Follow-up diabetes education appointment scheduled on Thursday Nov. 30 @ 9:30.      Bring blood glucose meter and logbook with you to all doctor and follow-up appointments.     [place] [facility] for the [facility]:  For Your Diabetes Education and Nutrition Appointments Call:  [phone]   For Diabetes Education or Nutrition Related Questions:   Phone: [phone]  E-mail: [email or web address]  Fax: [phone]   If you need a medication refill please contact your pharmacy. Please allow 3 business days for your refills to be completed.    Instructions for emailing the Diabetes Educators    If you need to communicate a non-urgent message to a Diabetes Educator via email, please send to [email or web address].    Please follow the following email guidelines:    Subject line: Secure: your clinic name (example: Secure: [facility])  In the email please include: First name, middle initial, last name and date of birth.    We will be in touch with you within one (1) business day.             Follow-ups after your visit         Your next 10 appointments already scheduled     Nov 07, 2017  9:40 AM CST   [facility]    [address] [facility] [address] [phone]            Nov 20, 2017 10:00 AM CST   Office Visit with Daniel Pina MD   [facility]    [address] [place] [address] [phone]           Bring a current list of meds and any records pertaining to this visit. For Physicals, please bring immunization records and any forms needing to be filled out. Please arrive 10 minutes early to complete paperwork.            Nov 30, 2017  9:30 AM CST   Diabetic Education with AN DIABETIC ED RESOURCE   [facility]    [address] [facility] [address] [phone]              Who to contact     If you have questions or need follow up information about today's clinic visit or your schedule please contact [facility] directly at [phone].  Normal or non-critical lab and imaging results will be communicated to you by MyChart, letter or phone within 4 business days after the clinic has received the results. If you do not hear from us within 7 days, please contact the clinic through MyChart or phone. If you have a critical or abnormal lab result, we will notify you by phone as soon as possible.  Submit refill requests through [employer] or call your pharmacy and they will forward the refill request to us. Please allow 3 business days for your refill to be completed.          Additional Information About Your Visit        Care EveryWhere ID     This is your Care EveryWhere ID. This could be used by other organizations to access your [place] medical records  [id number]        Your Vitals Were     Last Period                   09/19/2017 (Approximate)            Blood Pressure from Last 3 Encounters:   10/19/17 129/87    10/18/17 118/82    Weight from Last 3 Encounters:   10/19/17 89.8 kg (198 lb)   10/18/17 89.8 kg (198 lb)              Today, you had the following     No orders found for display         Today's Medication Changes          These changes are accurate as of: 10/31/17 10:31 AM.  If you have any questions, ask your nurse or doctor.               Start taking these medicines.        Dose/Directions    dulaglutide 0.75 MG/0.5ML pen   Commonly known as:  TRULICITY   Used for:  Diabetes mellitus, type 2 (H)        Dose:  0.75 mg   Inject 0.75 mg Subcutaneous every 7 days   Quantity:  2 mL   Refills:  1       insulin pen needle 32G X 4 MM   Commonly known as:  BD KAHLIL U/F   Used for:  Diabetes mellitus, type 2 (H)        Use 1 daily as directed.   Quantity:  100 each   Refills:  3       liraglutide 18 MG/3ML soln   Commonly known as:  VICTOZA   Used for:  Diabetes mellitus, type 2 (H)        Dose:  1.2 mg   Inject 1.2 mg Subcutaneous daily   Quantity:  6 mL   Refills:  1            Where to get your medicines      These medications were sent to [address], [address]  [address] [facility], [address] [address]     Phone:  [phone]     dulaglutide 0.75 MG/0.5ML pen    insulin pen needle 32G X 4 MM    liraglutide 18 MG/3ML soln                Primary Care Provider Office Phone # Fax #    Daniel Pina [phone]       [address] [facility] [address]        Equal Access to Services     [facility]: Hadii ramesh king hadasho Soomaali, waaxda luqadaha, qaybta kaalmada adeegjoshua, shaista idiin hayaan adeeg kharash la'aan . So [facility] [phone].    ATENCIÓN: Si habla español, tiene a easton disposición servicios gratuitos de asistencia lingüística. Llame al [phone].    We comply with applicable federal civil rights laws and [place] laws. We do not discriminate on the basis of race, color, national origin, age, disability, sex, sexual  orientation, or gender identity.            Thank you!     Thank you for [facility]  [facility]. Our goal is always to provide you with excellent care. Hearing back from our patients is one way we can continue to improve our services. Please take a few minutes to complete the written survey that you may receive in the mail after your visit with us. Thank you!             Your Updated Medication List - Protect others around you: Learn how to safely use, store and throw away your medicines at www.disposemymeds.org.          This list is accurate as of: 10/31/17 10:31 AM.  Always use your most recent med list.                   Brand Name Dispense Instructions for use Diagnosis    blood glucose calibration solution     1 Bottle    Use to calibrate blood glucose monitor as directed.    Type 2 diabetes mellitus with hyperglycemia, with long-term current use of insulin (H)       blood glucose lancets standard    no brand specified    100 each    Check blood sugar x 3 daily    Type 2 diabetes mellitus with hyperglycemia, with long-term current use of insulin (H)       * blood glucose monitoring meter device kit    no brand specified    1 kit    Check blood sugar x 3 daily    Type 2 diabetes mellitus with hyperglycemia, with long-term current use of insulin (H)       * blood glucose monitoring meter device kit     1 kit    Use to test blood sugars 3 times daily or as directed.    Type 2 diabetes mellitus with hyperglycemia, with long-term current use of insulin (H)       blood glucose monitoring test strip    no brand specified    100 strip    Check blood sugar x 3 daily    Type 2 diabetes mellitus with hyperglycemia, with long-term current use of insulin (H)       dulaglutide 0.75 MG/0.5ML pen    TRULICITY    2 mL    Inject 0.75 mg Subcutaneous every 7 days    Diabetes mellitus, type 2 (H)       ibuprofen 600 MG tablet    ADVIL/MOTRIN    15 tablet    Take 1 tablet (600 mg) by mouth every 8 hours  as needed for moderate pain    Type 2 diabetes mellitus with hyperglycemia, with long-term current use of insulin (H)       insulin glargine 100 UNIT/ML injection    LANTUS SOLOSTAR    15 mL    Inject 37 Units Subcutaneous At Bedtime    Type 2 diabetes mellitus with hyperglycemia, with long-term current use of insulin (H)       insulin pen needle 32G X 4 MM    BD KAHLIL U/F    100 each    Use 1 daily as directed.    Diabetes mellitus, type 2 (H)       IRON PO           liraglutide 18 MG/3ML soln    VICTOZA    6 mL    Inject 1.2 mg Subcutaneous daily    Diabetes mellitus, type 2 (H)       metFORMIN 500 MG 24 hr tablet    GLUCOPHAGE-XR    180 tablet    Take 2 tablets (1,000 mg) by mouth 2 times daily (with meals)    Type 2 diabetes mellitus with hyperglycemia, with long-term current use of insulin (H)       * Notice:  This list has 2 medication(s) that are the same as other medications prescribed for you. Read the directions carefully, and ask your doctor or other care provider to review them with you.

## 2017-10-31 NOTE — PATIENT INSTRUCTIONS
My Diabetes Care Goals:    Healthy Eating: 3 meals per day, every 4- 5 hrs apart and proteins all meals and 1-2 snacks each day. 3-4 carb choices with meals.      Being Active: continue at the gym    Monitoring: check every morning 80-13 and then 2 hrs after a meal.  Goal is < 180    Taking Medication: Metformin  mg take 2 tabs with breakfast and 2 tabs with dinner  Lantus 37 units  If we are to start Victoza 0.6 mg X 7 days then increase to 1.2 mg on day and stay there.        Follow up:  Follow-up diabetes education appointment scheduled on Thursday Nov. 30 @ 9:30.      Bring blood glucose meter and logbook with you to all doctor and follow-up appointments.     [place] [facility] for the [facility]:  For Your Diabetes Education and Nutrition Appointments Call:  [phone]   For Diabetes Education or Nutrition Related Questions:   Phone: [phone]  E-mail: [email or web address]  Fax: [phone]   If you need a medication refill please contact your pharmacy. Please allow 3 business days for your refills to be completed.    Instructions for emailing the Diabetes Educators    If you need to communicate a non-urgent message to a Diabetes Educator via email, please send to [email or web address].    Please follow the following email guidelines:    Subject line: Secure: your clinic name (example: Secure: [facility])  In the email please include: First name, middle initial, last name and date of birth.    We will be in touch with you within one (1) business day.

## 2017-10-31 NOTE — TELEPHONE ENCOUNTER
----- Message from Dara Mehta RN sent at 10/31/2017 10:19 AM CDT -----  I am seeing Concha and she is doing very well with her meals and tolerating insulin.  BG are coming down nicely.  Would like to start her on a GLP-1 like Victoza.  Do you approve.?    Pebbles Mehta RN/SEN  [place] Diabetes Educator

## 2017-11-07 ENCOUNTER — OFFICE VISIT (OUTPATIENT)
Dept: FAMILY MEDICINE | Facility: CLINIC | Age: 42
End: 2017-11-07
Payer: COMMERCIAL

## 2017-11-07 ENCOUNTER — OFFICE VISIT (OUTPATIENT)
Dept: OPTOMETRY | Facility: CLINIC | Age: 42
End: 2017-11-07
Payer: COMMERCIAL

## 2017-11-07 VITALS
WEIGHT: 200.2 LBS | BODY MASS INDEX: 32.31 KG/M2 | SYSTOLIC BLOOD PRESSURE: 129 MMHG | TEMPERATURE: 99.1 F | DIASTOLIC BLOOD PRESSURE: 81 MMHG | HEART RATE: 107 BPM | OXYGEN SATURATION: 98 %

## 2017-11-07 DIAGNOSIS — R30.0 DYSURIA: Primary | ICD-10-CM

## 2017-11-07 DIAGNOSIS — R10.11 RUQ ABDOMINAL PAIN: ICD-10-CM

## 2017-11-07 DIAGNOSIS — N39.0 URINARY TRACT INFECTION WITHOUT HEMATURIA, SITE UNSPECIFIED: ICD-10-CM

## 2017-11-07 DIAGNOSIS — Z79.4 CONTROLLED TYPE 2 DIABETES MELLITUS WITH OTHER OPHTHALMIC COMPLICATION, WITH LONG-TERM CURRENT USE OF INSULIN (H): Primary | ICD-10-CM

## 2017-11-07 DIAGNOSIS — E11.39 CONTROLLED TYPE 2 DIABETES MELLITUS WITH OTHER OPHTHALMIC COMPLICATION, WITH LONG-TERM CURRENT USE OF INSULIN (H): Primary | ICD-10-CM

## 2017-11-07 DIAGNOSIS — H52.13 MYOPIA OF BOTH EYES: ICD-10-CM

## 2017-11-07 DIAGNOSIS — H52.4 PRESBYOPIA: ICD-10-CM

## 2017-11-07 LAB
ALBUMIN SERPL-MCNC: 3.3 G/DL (ref 3.4–5)
ALBUMIN UR-MCNC: NEGATIVE MG/DL
ALP SERPL-CCNC: 65 U/L (ref 40–150)
ALT SERPL W P-5'-P-CCNC: 16 U/L (ref 0–50)
AMYLASE SERPL-CCNC: 45 U/L (ref 30–110)
ANION GAP SERPL CALCULATED.3IONS-SCNC: 8 MMOL/L (ref 3–14)
APPEARANCE UR: CLEAR
AST SERPL W P-5'-P-CCNC: 7 U/L (ref 0–45)
BASOPHILS # BLD AUTO: 0 10E9/L (ref 0–0.2)
BASOPHILS NFR BLD AUTO: 0.3 %
BILIRUB SERPL-MCNC: 0.3 MG/DL (ref 0.2–1.3)
BILIRUB UR QL STRIP: NEGATIVE
BUN SERPL-MCNC: 9 MG/DL (ref 7–30)
CALCIUM SERPL-MCNC: 9 MG/DL (ref 8.5–10.1)
CHLORIDE SERPL-SCNC: 101 MMOL/L (ref 94–109)
CO2 SERPL-SCNC: 27 MMOL/L (ref 20–32)
COLOR UR AUTO: YELLOW
CREAT SERPL-MCNC: 0.69 MG/DL (ref 0.52–1.04)
CRP SERPL-MCNC: 64.6 MG/L (ref 0–8)
DIFFERENTIAL METHOD BLD: ABNORMAL
EOSINOPHIL # BLD AUTO: 0.2 10E9/L (ref 0–0.7)
EOSINOPHIL NFR BLD AUTO: 2.6 %
ERYTHROCYTE [DISTWIDTH] IN BLOOD BY AUTOMATED COUNT: 11.5 % (ref 10–15)
ERYTHROCYTE [SEDIMENTATION RATE] IN BLOOD BY WESTERGREN METHOD: 36 MM/H (ref 0–20)
GFR SERPL CREATININE-BSD FRML MDRD: >90 ML/MIN/1.7M2
GLUCOSE SERPL-MCNC: 130 MG/DL (ref 70–99)
GLUCOSE UR STRIP-MCNC: NEGATIVE MG/DL
HCT VFR BLD AUTO: 33.8 % (ref 35–47)
HGB BLD-MCNC: 11.5 G/DL (ref 11.7–15.7)
HGB UR QL STRIP: ABNORMAL
KETONES UR STRIP-MCNC: NEGATIVE MG/DL
LEUKOCYTE ESTERASE UR QL STRIP: ABNORMAL
LIPASE SERPL-CCNC: 130 U/L (ref 73–393)
LYMPHOCYTES # BLD AUTO: 1.6 10E9/L (ref 0.8–5.3)
LYMPHOCYTES NFR BLD AUTO: 26.7 %
MCH RBC QN AUTO: 29.5 PG (ref 26.5–33)
MCHC RBC AUTO-ENTMCNC: 34 G/DL (ref 31.5–36.5)
MCV RBC AUTO: 87 FL (ref 78–100)
MONOCYTES # BLD AUTO: 0.6 10E9/L (ref 0–1.3)
MONOCYTES NFR BLD AUTO: 9.2 %
NEUTROPHILS # BLD AUTO: 3.7 10E9/L (ref 1.6–8.3)
NEUTROPHILS NFR BLD AUTO: 61.2 %
NITRATE UR QL: NEGATIVE
NON-SQ EPI CELLS #/AREA URNS LPF: ABNORMAL /LPF
PH UR STRIP: 6 PH (ref 5–7)
PLATELET # BLD AUTO: 328 10E9/L (ref 150–450)
POTASSIUM SERPL-SCNC: 4.2 MMOL/L (ref 3.4–5.3)
PROT SERPL-MCNC: 7.8 G/DL (ref 6.8–8.8)
RBC # BLD AUTO: 3.9 10E12/L (ref 3.8–5.2)
RBC #/AREA URNS AUTO: ABNORMAL /HPF
SODIUM SERPL-SCNC: 136 MMOL/L (ref 133–144)
SOURCE: ABNORMAL
SP GR UR STRIP: <=1.005 (ref 1–1.03)
UROBILINOGEN UR STRIP-ACNC: 0.2 EU/DL (ref 0.2–1)
WBC # BLD AUTO: 6.1 10E9/L (ref 4–11)
WBC #/AREA URNS AUTO: ABNORMAL /HPF

## 2017-11-07 PROCEDURE — 99214 OFFICE O/P EST MOD 30 MIN: CPT | Performed by: PHYSICIAN ASSISTANT

## 2017-11-07 PROCEDURE — 85652 RBC SED RATE AUTOMATED: CPT | Performed by: PHYSICIAN ASSISTANT

## 2017-11-07 PROCEDURE — 36415 COLL VENOUS BLD VENIPUNCTURE: CPT | Performed by: PHYSICIAN ASSISTANT

## 2017-11-07 PROCEDURE — 92015 DETERMINE REFRACTIVE STATE: CPT | Performed by: OPTOMETRIST

## 2017-11-07 PROCEDURE — 80053 COMPREHEN METABOLIC PANEL: CPT | Performed by: PHYSICIAN ASSISTANT

## 2017-11-07 PROCEDURE — 92004 COMPRE OPH EXAM NEW PT 1/>: CPT | Performed by: OPTOMETRIST

## 2017-11-07 PROCEDURE — 81001 URINALYSIS AUTO W/SCOPE: CPT | Performed by: PHYSICIAN ASSISTANT

## 2017-11-07 PROCEDURE — 82150 ASSAY OF AMYLASE: CPT | Performed by: PHYSICIAN ASSISTANT

## 2017-11-07 PROCEDURE — 85025 COMPLETE CBC W/AUTO DIFF WBC: CPT | Performed by: PHYSICIAN ASSISTANT

## 2017-11-07 PROCEDURE — 87086 URINE CULTURE/COLONY COUNT: CPT | Performed by: PHYSICIAN ASSISTANT

## 2017-11-07 PROCEDURE — 86140 C-REACTIVE PROTEIN: CPT | Performed by: PHYSICIAN ASSISTANT

## 2017-11-07 PROCEDURE — 83690 ASSAY OF LIPASE: CPT | Performed by: PHYSICIAN ASSISTANT

## 2017-11-07 RX ORDER — CIPROFLOXACIN 250 MG/1
250 TABLET, FILM COATED ORAL 2 TIMES DAILY
Qty: 6 TABLET | Refills: 0 | Status: SHIPPED | OUTPATIENT
Start: 2017-11-07

## 2017-11-07 ASSESSMENT — VISUAL ACUITY
OD_SC: 20/30
OS_CC: 20/20
CORRECTION_TYPE: GLASSES
OD_SC+: -1
OD_SC: 20/50
OS_SC: 20/25
OS_SC: 20/40-1
OD_CC: 20/20
METHOD: SNELLEN - LINEAR

## 2017-11-07 ASSESSMENT — TONOMETRY
OS_IOP_MMHG: 14
OD_IOP_MMHG: 14
IOP_METHOD: APPLANATION

## 2017-11-07 ASSESSMENT — SLIT LAMP EXAM - LIDS
COMMENTS: NORMAL
COMMENTS: NORMAL

## 2017-11-07 ASSESSMENT — KERATOMETRY
OD_K1POWER_DIOPTERS: 44.00
OS_K2POWER_DIOPTERS: 44.75
OD_K2POWER_DIOPTERS: 45.00
OS_AXISANGLE2_DEGREES: 175
OS_K1POWER_DIOPTERS: 43.75
OD_AXISANGLE2_DEGREES: 175

## 2017-11-07 ASSESSMENT — EXTERNAL EXAM - LEFT EYE: OS_EXAM: NORMAL

## 2017-11-07 ASSESSMENT — REFRACTION_MANIFEST
OD_CYLINDER: +0.50
OD_CYLINDER: SPHERE
OS_CYLINDER: SPHERE
METHOD_AUTOREFRACTION: 1
OD_ADD: +1.50
OD_SPHERE: -1.50
OS_SPHERE: -0.50
OS_ADD: +1.50
OD_AXIS: 092
OS_AXIS: 076
OD_SPHERE: -1.25
OS_SPHERE: -0.75
OS_CYLINDER: +0.50

## 2017-11-07 ASSESSMENT — REFRACTION_WEARINGRX
OD_CYLINDER: +0.25
OD_AXIS: 085
OS_SPHERE: -0.50
OD_SPHERE: -2.00
OS_CYLINDER: SPHERE
SPECS_TYPE: SVL

## 2017-11-07 ASSESSMENT — CONF VISUAL FIELD
METHOD: COUNTING FINGERS
OS_NORMAL: 1
OD_NORMAL: 1

## 2017-11-07 ASSESSMENT — EXTERNAL EXAM - RIGHT EYE: OD_EXAM: NORMAL

## 2017-11-07 ASSESSMENT — CUP TO DISC RATIO
OS_RATIO: 0.5
OD_RATIO: 0.5

## 2017-11-07 NOTE — MR AVS SNAPSHOT
After Visit Summary   11/7/2017    Concha Castanon    MRN: [id number]           Patient Information     Date Of Birth          1975        Visit Information        Provider Department      11/7/2017 11:20 AM Miesha Han PA-C [facility]        Today's Diagnoses     Dysuria    -  1    RUQ abdominal pain           Follow-ups after your visit        Your next 10 appointments already scheduled     Nov 08, 2017  8:30 AM CST   US ABDOMEN COMPLETE with BEUS1   [facility] [place] ([facility])    [address] [facility]  [place] [id number] [phone]           Please bring a list of your medicines (including vitamins, minerals and over-the-counter drugs). Also, tell your doctor about any allergies you may have. Wear comfortable clothes and leave your valuables at home.  Adults: No eating or drinking for 8 hours before the exam. You may take medicine with a small sip of water.  Children: - Children 6+ years: No food or drink for 6 hours before exam. - Children 1-5 years: No food or drink for 4 hours before exam. - Infants, breast-fed: may have breast milk up to 2 hours before exam. - Infants, formula: may have bottle until 4 hours before exam.  Please call the Imaging Department at your exam site with any questions.            Nov 20, 2017 10:00 AM CST   Office Visit with Daniel Pina MD   [facility] ([facility] [place])    [address] [facility]  [place] [id number] [phone]           Bring a current list of meds and any records pertaining to this visit. For Physicals, please bring immunization records and any forms needing to be filled out. Please arrive 10 minutes early to complete paperwork.            Nov 30, 2017  9:30 AM CST   Diabetic Education with AN DIABETIC ED RESOURCE   [facility] ([facility])    [address] [facility] [address] [phone]              Future tests  that were ordered for you today     Open Future Orders        Priority Expected Expires Ordered    US Abdomen Complete STAT  11/7/2018 11/7/2017            Who to contact     If you have questions or need follow up information about today's clinic visit or your schedule please contact [facility] directly at [phone].  Normal or non-critical lab and imaging results will be communicated to you by MyChart, letter or phone within 4 business days after the clinic has received the results. If you do not hear from us within 7 days, please contact the clinic through MyChart or phone. If you have a critical or abnormal lab result, we will notify you by phone as soon as possible.  Submit refill requests through [employer] or call your pharmacy and they will forward the refill request to us. Please allow 3 business days for your refill to be completed.          Additional Information About Your Visit        Care EveryWhere ID     This is your Care EveryWhere ID. This could be used by other organizations to access your [place] medical records  [id number]        Your Vitals Were     Pulse Temperature Last Period Pulse Oximetry BMI (Body Mass Index)       107 99.1  F (37.3  C) (Oral) 09/19/2017 (Approximate) 98% 32.31 kg/m2        Blood Pressure from Last 3 Encounters:   11/07/17 129/81   10/19/17 129/87   10/18/17 118/82    Weight from Last 3 Encounters:   11/07/17 200 lb 3.2 oz (90.8 kg)   10/19/17 198 lb (89.8 kg)   10/18/17 198 lb (89.8 kg)              We Performed the Following     *UA reflex to Microscopic and Culture ([place] [facility] (except [place] and [employer])     Amylase     CBC with platelets differential     Comprehensive metabolic panel (BMP + Alb, Alk Phos, ALT, AST, Total. Bili, TP)     CRP inflammation     ESR: Erythrocyte sedimentation rate     Lipase     Urine Culture Aerobic Bacterial     Urine Microscopic          Today's Medication Changes          These changes are accurate  as of: 11/7/17 12:00 PM.  If you have any questions, ask your nurse or doctor.               Start taking these medicines.        Dose/Directions    ciprofloxacin 250 MG tablet   Commonly known as:  CIPRO   Used for:  Dysuria   Started by:  Miesha Han PA-C        Dose:  250 mg   Take 1 tablet (250 mg) by mouth 2 times daily   Quantity:  6 tablet   Refills:  0            Where to get your medicines      These medications were sent to [facility] [address], [address]  [address] [address] [facility] [address] [facility] [address] [address]     Phone:  [phone]     ciprofloxacin 250 MG tablet                Primary Care Provider Office Phone # Fax #    Daniel Pina [phone] [address]  [place] [address]        Equal Access to Services     [facility]: Hadii aad fernando hadasho Soomaali, waaxda luqadaha, qaybta kaalmada adeegyada, shaista brown . So [facility] [phone].    ATENCIÓN: Si habla español, tiene a easton disposición servicios gratuitos de asistencia lingüística. Debra al [phone].    We comply with applicable federal civil rights laws and [place] laws. We do not discriminate on the basis of race, color, national origin, age, disability, sex, sexual orientation, or gender identity.            Thank you!     Thank you for choosing [facility]  for your care. Our goal is always to provide you with excellent care. Hearing back from our patients is one way we can continue to improve our services. Please take a few minutes to complete the written survey that you may receive in the mail after your visit with us. Thank you!             Your Updated Medication List - Protect others around you: Learn how to safely use, store and throw away your medicines at www.disposemymeds.org.          This list is accurate as of: 11/7/17 12:00 PM.  Always use your most recent med list.                   Brand Name Dispense  Instructions for use Diagnosis    blood glucose calibration solution     1 Bottle    Use to calibrate blood glucose monitor as directed.    Type 2 diabetes mellitus with hyperglycemia, with long-term current use of insulin (H)       blood glucose lancets standard    no brand specified    100 each    Check blood sugar x 3 daily    Type 2 diabetes mellitus with hyperglycemia, with long-term current use of insulin (H)       * blood glucose monitoring meter device kit    no brand specified    1 kit    Check blood sugar x 3 daily    Type 2 diabetes mellitus with hyperglycemia, with long-term current use of insulin (H)       * blood glucose monitoring meter device kit     1 kit    Use to test blood sugars 3 times daily or as directed.    Type 2 diabetes mellitus with hyperglycemia, with long-term current use of insulin (H)       blood glucose monitoring test strip    no brand specified    100 strip    Check blood sugar x 3 daily    Type 2 diabetes mellitus with hyperglycemia, with long-term current use of insulin (H)       ciprofloxacin 250 MG tablet    CIPRO    6 tablet    Take 1 tablet (250 mg) by mouth 2 times daily    Dysuria       dulaglutide 0.75 MG/0.5ML pen    TRULICITY    2 mL    Inject 0.75 mg Subcutaneous every 7 days    Diabetes mellitus, type 2 (H)       ibuprofen 600 MG tablet    ADVIL/MOTRIN    15 tablet    Take 1 tablet (600 mg) by mouth every 8 hours as needed for moderate pain    Type 2 diabetes mellitus with hyperglycemia, with long-term current use of insulin (H)       insulin glargine 100 UNIT/ML injection    LANTUS SOLOSTAR    15 mL    Inject 37 Units Subcutaneous At Bedtime    Type 2 diabetes mellitus with hyperglycemia, with long-term current use of insulin (H)       insulin pen needle 32G X 4 MM    BD KAHLIL U/F    100 each    Use 1 daily as directed.    Diabetes mellitus, type 2 (H)       IRON PO           liraglutide 18 MG/3ML soln    VICTOZA    6 mL    Inject 1.2 mg Subcutaneous daily    Diabetes  mellitus, type 2 (H)       metFORMIN 500 MG 24 hr tablet    GLUCOPHAGE-XR    180 tablet    Take 2 tablets (1,000 mg) by mouth 2 times daily (with meals)    Type 2 diabetes mellitus with hyperglycemia, with long-term current use of insulin (H)       * Notice:  This list has 2 medication(s) that are the same as other medications prescribed for you. Read the directions carefully, and ask your doctor or other care provider to review them with you.

## 2017-11-07 NOTE — PROGRESS NOTES
Chief Complaint   Patient presents with     Diabetic Eye Exam     New to Eye Dept        Lab Results   Component Value Date    A1C 13.1 10/18/2017    A1C 9.8 08/09/2016    A1C 7.5 01/15/2016    A1C 13.4 06/03/2015       Last Eye Exam: 2+ years ago   Dilated Previously: Yes    What are you currently using to see?  Glasses, wears glasses most of the time. Flips them up to read     Distance Vision Acuity: Satisfied with vision, no changes. Glasses work     Near Vision Acuity: Satisfied with vision while reading and using computer unaided, has noticed that she holds things out further away     Eye Comfort: dry  Do you use eye drops? 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   Winter feel dry, Spring itchy eyes with allergies, oral meds not help eyes      OBJECTIVE: See Ophthalmology exam    ASSESSMENT:    ICD-10-CM    1. Controlled type 2 diabetes mellitus with other ophthalmic complication, with long-term current use of insulin (H) E11.39 EYE EXAM (SIMPLE-NONBILLABLE)    Z79.4 REFRACTION   2. Myopia of both eyes H52.13 EYE EXAM (SIMPLE-NONBILLABLE)     REFRACTION   3. Presbyopia H52.4 EYE EXAM (SIMPLE-NONBILLABLE)     REFRACTION      PLAN:    Concha Castanon aware  eye exam results will be sent to Daniel Pina.  Patient Instructions   Patient was advised of today's exam findings.  Fill glasses prescription- advised that prescription will change as blood sugar gets under better control  bifocal option discussed   Allow 2 weeks to adapt to change in glasses  Use over the counter artificial tears 2 times a day (Thera Tears, Systane Ultra or Refresh Optive)  Use Zaditor or Alaway allergy drop twice a day as needed for itchy eyes  Patient is aware of the importance of improving blood sugar control to protect vision  Work on improving  blood  sugar control  Return in 1 year for diabetic eye exam or sooner if notices sudden vision change    Sobeida Leon O.D.  [facility]   [address] [place], [place] [id number] [phone]

## 2017-11-07 NOTE — PATIENT INSTRUCTIONS
Patient was advised of today's exam findings.  Fill glasses prescription- advised that prescription will change as blood sugar gets under better control  bifocal option discussed   Allow 2 weeks to adapt to change in glasses  Use over the counter artificial tears 2 times a day (Thera Tears, Systane Ultra or Refresh Optive)  Use Zaditor or Alaway allergy drop twice a day as needed for itchy eyes  Patient is aware of the importance of improving blood sugar control to protect vision  Work on improving  blood sugar control  Return in 1 year for diabetic eye exam or sooner if notices sudden vision change    Sobeida Leon O.D.  [facility]   [address] [address] [phone]

## 2017-11-07 NOTE — NURSING NOTE
"Chief Complaint   Patient presents with     Fatigue     urine odor, cloudy urine X 1 month       Initial /81  Pulse 107  Temp 99.1  F (37.3  C) (Oral)  Wt 200 lb 3.2 oz (90.8 kg)  LMP 09/19/2017 (Approximate)  SpO2 98%  BMI 32.31 kg/m2 Estimated body mass index is 32.31 kg/(m^2) as calculated from the following:    Height as of 10/19/17: 5' 6\" (1.676 m).    Weight as of this encounter: 200 lb 3.2 oz (90.8 kg).  Medication Reconciliation: complete   Jackie Patel CMA      "

## 2017-11-07 NOTE — PROGRESS NOTES
SUBJECTIVE:   Concha Castanon is a 42 year old female who presents to clinic today for the following health issues:      URINARY TRACT SYMPTOMS      Duration: X 1 month    Description  frequency, odor and cloudy urine, right upper abdomen pain, mild dysuria    Intensity:  moderate    Accompanying signs and symptoms:  Fever/chills: YES  Flank pain no   Nausea and vomiting: YES- nausea  Vaginal symptoms: none  Abdominal/Pelvic Pain: YES- lower right abdominal pressure    History  History of frequent UTI's: YES- just treated on 10/19/17 for UTI  History of kidney stones: no   Sexually Active: YES  Possibility of pregnancy: Don't Know    Precipitating or alleviating factors: None    Therapies tried and outcome: Rocephin   Outcome: helped but not completely         Seen for UTI 10/19/2017. Given oral Cipro then IM Rocephin. Symptoms resolved. Now with RUQ pain x 2 days. Lots of gas and belching over the last 2 weeks. Pain radiates to R scapula.  Has a HA and just does not feel good. Pain worse with deep breath or after eating. No CP or dizziness or SOB. No fever. No constipation or diarrhea. UC showed 2 strains of E. Coli sensitive to Cipro and Rocephin.      No Known Allergies    Past Medical History:   Diagnosis Date     Diabetes (H)     type 2     Uncomplicated asthma          Current Outpatient Prescriptions on File Prior to Visit:  liraglutide (VICTOZA) 18 MG/3ML soln Inject 1.2 mg Subcutaneous daily   insulin pen needle (BD KAHLIL U/F) 32G X 4 MM Use 1 daily as directed.   dulaglutide (TRULICITY) 0.75 MG/0.5ML pen Inject 0.75 mg Subcutaneous every 7 days   IRON PO    metFORMIN (GLUCOPHAGE-XR) 500 MG 24 hr tablet Take 2 tablets (1,000 mg) by mouth 2 times daily (with meals)   insulin glargine (LANTUS SOLOSTAR) 100 UNIT/ML injection Inject 37 Units Subcutaneous At Bedtime   ibuprofen (ADVIL/MOTRIN) 600 MG tablet Take 1 tablet (600 mg) by mouth every 8 hours as needed for moderate pain   blood glucose monitoring (NO  BRAND SPECIFIED) test strip Check blood sugar x 3 daily   blood glucose monitoring (NO BRAND SPECIFIED) meter device kit Check blood sugar x 3 daily   blood glucose (NO BRAND SPECIFIED) lancets standard Check blood sugar x 3 daily   blood glucose calibration (ONETOUCH ULTRA CONTROL) solution Use to calibrate blood glucose monitor as directed.   blood glucose monitoring (ONE TOUCH ULTRA 2) meter device kit Use to test blood sugars 3 times daily or as directed.     No current facility-administered medications on file prior to visit.     Social History   Substance Use Topics     Smoking status: Never Smoker     Smokeless tobacco: Never Used     Alcohol use Yes      Comment: OCC        ROS:  General: negative for fever  Resp: negative for chest pain   CV: negative for chest pain  ABD: as above  : negative for dysuria  Neurologic:negative for Headache    OBJECTIVE:  /81  Pulse 107  Temp 99.1  F (37.3  C) (Oral)  Wt 200 lb 3.2 oz (90.8 kg)  LMP 09/19/2017 (Approximate)  SpO2 98%  BMI 32.31 kg/m2   General:   awake, alert, and cooperative.  NAD.   Head: Normocephalic, atraumatic.  Eyes: Conjunctiva clear, non icteric.   Heart: Regular rate and rhythm. No murmur.  Lungs: Chest is clear; no wheezes or rales.  ABD: soft, Mild- moderate RUQ tenderness to palpation , no rigidity, guarding or rebound , bowel sounds intact. NABS. No HSM  Neuro: Alert and oriented - normal speech.   Back- No CVA tenderness    Results for orders placed or performed in visit on 11/07/17   *UA reflex to Microscopic and Culture ([place] and [facility] (except [place] and [place])   Result Value Ref Range    Color Urine Yellow     Appearance Urine Clear     Glucose Urine Negative NEG^Negative mg/dL    Bilirubin Urine Negative NEG^Negative    Ketones Urine Negative NEG^Negative mg/dL    Specific Gravity Urine <=1.005 1.003 - 1.035    Blood Urine Trace (A) NEG^Negative    pH Urine 6.0 5.0 - 7.0 pH    Protein Albumin Urine Negative  NEG^Negative mg/dL    Urobilinogen Urine 0.2 0.2 - 1.0 EU/dL    Nitrite Urine Negative NEG^Negative    Leukocyte Esterase Urine Small (A) NEG^Negative    Source Midstream Urine    Urine Microscopic   Result Value Ref Range    WBC Urine 5-10 (A) OTO2^O - 2 /HPF    RBC Urine O - 2 OTO2^O - 2 /HPF    Squamous Epithelial /LPF Urine Few FEW^Few /LPF   CBC with platelets differential   Result Value Ref Range    WBC 6.1 4.0 - 11.0 10e9/L    RBC Count 3.90 3.8 - 5.2 10e12/L    Hemoglobin 11.5 (L) 11.7 - 15.7 g/dL    Hematocrit 33.8 (L) 35.0 - 47.0 %    MCV 87 78 - 100 fl    MCH 29.5 26.5 - 33.0 pg    MCHC 34.0 31.5 - 36.5 g/dL    RDW 11.5 10.0 - 15.0 %    Platelet Count 328 150 - 450 10e9/L    Diff Method Automated Method     % Neutrophils 61.2 %    % Lymphocytes 26.7 %    % Monocytes 9.2 %    % Eosinophils 2.6 %    % Basophils 0.3 %    Absolute Neutrophil 3.7 1.6 - 8.3 10e9/L    Absolute Lymphocytes 1.6 0.8 - 5.3 10e9/L    Absolute Monocytes 0.6 0.0 - 1.3 10e9/L    Absolute Eosinophils 0.2 0.0 - 0.7 10e9/L    Absolute Basophils 0.0 0.0 - 0.2 10e9/L   Lipase   Result Value Ref Range    Lipase 130 73 - 393 U/L   Amylase   Result Value Ref Range    Amylase 45 30 - 110 U/L   Comprehensive metabolic panel (BMP + Alb, Alk Phos, ALT, AST, Total. Bili, TP)   Result Value Ref Range    Sodium 136 133 - 144 mmol/L    Potassium 4.2 3.4 - 5.3 mmol/L    Chloride 101 94 - 109 mmol/L    Carbon Dioxide 27 20 - 32 mmol/L    Anion Gap 8 3 - 14 mmol/L    Glucose 130 (H) 70 - 99 mg/dL    Urea Nitrogen 9 7 - 30 mg/dL    Creatinine 0.69 0.52 - 1.04 mg/dL    GFR Estimate >90 >60 mL/min/1.7m2    GFR Estimate If Black >90 >60 mL/min/1.7m2    Calcium 9.0 8.5 - 10.1 mg/dL    Bilirubin Total 0.3 0.2 - 1.3 mg/dL    Albumin 3.3 (L) 3.4 - 5.0 g/dL    Protein Total 7.8 6.8 - 8.8 g/dL    Alkaline Phosphatase 65 40 - 150 U/L    ALT 16 0 - 50 U/L    AST 7 0 - 45 U/L   CRP inflammation   Result Value Ref Range    CRP Inflammation 64.6 (H) 0.0 - 8.0 mg/L    ESR: Erythrocyte sedimentation rate   Result Value Ref Range    Sed Rate 36 (H) 0 - 20 mm/h           ASSESSMENT:    ICD-10-CM    1. Dysuria R30.0 *UA reflex to Microscopic and Culture ([place] and [facility] (except [place] and [place])     Urine Microscopic     ciprofloxacin (CIPRO) 250 MG tablet   2. Urinary tract infection without hematuria, site unspecified N39.0    3. RUQ abdominal pain R10.11 CBC with platelets differential     Lipase     Amylase     Comprehensive metabolic panel (BMP + Alb, Alk Phos, ALT, AST, Total. Bili, TP)     US Abdomen Complete     CRP inflammation     ESR: Erythrocyte sedimentation rate     Urine Culture Aerobic Bacterial             PLAN: Will start Cipro with 5-10 wbc's in urine and await UC results. Main concern is with the RUQ pain today. US of Abdomen. Will call with all results. F/U PCP this week. To ER if worsens in the meantime.       Advised about symptoms which might herald more serious problems.    ADDENDUM: CRP-ESR tests came back later. I tried to call patient at home # and on cell.No answer. Task routed. Needs to go to ER if not feeling better. Otherwise proceed with scheduled abd US tomorrow and f/U with PCP.     Miesha Han PA-C

## 2017-11-07 NOTE — MR AVS SNAPSHOT
After Visit Summary   11/7/2017    Concha Castanon    MRN: [id number]           Patient Information     Date Of Birth          1975        Visit Information        Provider Department      11/7/2017 9:40 AM Sobeida Leon OD [facility]        Today's Diagnoses     Myopia of both eyes    -  1      Care Instructions    Patient was advised of today's exam findings.  Fill glasses prescription  Allow 2 weeks to adapt to change in glasses  Use over the counter artificial tears 2 times a day (Thera Tears, Systane Ultra or Refresh Optive)  Use Zaditor or Alaway allergy drop twice a day as needed for itchy eyes    Work on improving  blood sugar  control  Return in 1 year for diabetic eye exam    Sobeida Leon O.D.  [facility]   [address] [place] [address] [phone]            Follow-ups after your visit        Your next 10 appointments already scheduled     Nov 07, 2017 11:20 AM CST   [facility]    [address] [facility] [address] [phone]            Nov 20, 2017 10:00 AM CST   [facility] Visit with Daniel Pina MD   [facility]    [address] [place], [place] [address] [address] [phone]           Bring a current list of meds and any records pertaining to this visit. For Physicals, please bring immunization records and any forms needing to be filled out. Please arrive 10 minutes early to complete paperwork.            Nov 30, 2017  9:30 AM CST   Diabetic Education with AN DIABETIC ED RESOURCE   [facility]    [address] [facility] [address] [phone]              Who to contact     If you have questions or need follow up information about today's clinic visit or your schedule please contact [facility] directly at [phone].  Normal or  non-critical lab and imaging results will be communicated to you by MyChart, letter or phone within 4 business days after the clinic has received the results. If you do not hear from us within 7 days, please contact the clinic through MyChart or phone. If you have a critical or abnormal lab result, we will notify you by phone as soon as possible.  Submit refill requests through [employer] or call your pharmacy and they will forward the refill request to us. Please allow 3 business days for your refill to be completed.          Additional Information About Your Visit        Care EveryWhere ID     This is your Care EveryWhere ID. This could be used by other organizations to access your [facility] medical records  [id number]        Your Vitals Were     Last Period                   09/19/2017 (Approximate)            Blood Pressure from Last 3 Encounters:   10/19/17 129/87   10/18/17 118/82    Weight from Last 3 Encounters:   10/19/17 89.8 kg (198 lb)   10/18/17 89.8 kg (198 lb)              Today, you had the following     No orders found for display       Primary Care Provider Office Phone # Fax #    Daniel Pina [phone]       [address] [facility] [address]        Equal Access to Services     JEREMY ORTIZ : Hadii ramesh olivao Soxin, waaxda luqadaha, qaybta kaalmada adeluis albertoda, shaista penn. So [facility] [phone].    ATENCIÓN: Si habla español, tiene a easton disposición servicios gratuitos de asistencia lingüística. Llame al [phone].    We comply with applicable federal civil rights laws and [place] laws. We do not discriminate on the basis of race, color, national origin, age, disability, sex, sexual orientation, or gender identity.            Thank you!     Thank you for [facility]  [facility]. Our goal is always to provide you with excellent care. Hearing back from our patients is one way we can continue to improve  our services. Please take a few minutes to complete the written survey that you may receive in the mail after your visit with us. Thank you!             Your Updated Medication List - Protect others around you: Learn how to safely use, store and throw away your medicines at www.disposemymeds.org.          This list is accurate as of: 11/7/17 10:52 AM.  Always use your most recent med list.                   Brand Name Dispense Instructions for use Diagnosis    blood glucose calibration solution     1 Bottle    Use to calibrate blood glucose monitor as directed.    Type 2 diabetes mellitus with hyperglycemia, with long-term current use of insulin (H)       blood glucose lancets standard    no brand specified    100 each    Check blood sugar x 3 daily    Type 2 diabetes mellitus with hyperglycemia, with long-term current use of insulin (H)       * blood glucose monitoring meter device kit    no brand specified    1 kit    Check blood sugar x 3 daily    Type 2 diabetes mellitus with hyperglycemia, with long-term current use of insulin (H)       * blood glucose monitoring meter device kit     1 kit    Use to test blood sugars 3 times daily or as directed.    Type 2 diabetes mellitus with hyperglycemia, with long-term current use of insulin (H)       blood glucose monitoring test strip    no brand specified    100 strip    Check blood sugar x 3 daily    Type 2 diabetes mellitus with hyperglycemia, with long-term current use of insulin (H)       dulaglutide 0.75 MG/0.5ML pen    TRULICITY    2 mL    Inject 0.75 mg Subcutaneous every 7 days    Diabetes mellitus, type 2 (H)       ibuprofen 600 MG tablet    ADVIL/MOTRIN    15 tablet    Take 1 tablet (600 mg) by mouth every 8 hours as needed for moderate pain    Type 2 diabetes mellitus with hyperglycemia, with long-term current use of insulin (H)       insulin glargine 100 UNIT/ML injection    LANTUS SOLOSTAR    15 mL    Inject 37 Units Subcutaneous At Bedtime    Type 2  diabetes mellitus with hyperglycemia, with long-term current use of insulin (H)       insulin pen needle 32G X 4 MM    BD KAHLIL U/F    100 each    Use 1 daily as directed.    Diabetes mellitus, type 2 (H)       IRON PO           liraglutide 18 MG/3ML soln    VICTOZA    6 mL    Inject 1.2 mg Subcutaneous daily    Diabetes mellitus, type 2 (H)       metFORMIN 500 MG 24 hr tablet    GLUCOPHAGE-XR    180 tablet    Take 2 tablets (1,000 mg) by mouth 2 times daily (with meals)    Type 2 diabetes mellitus with hyperglycemia, with long-term current use of insulin (H)       * Notice:  This list has 2 medication(s) that are the same as other medications prescribed for you. Read the directions carefully, and ask your doctor or other care provider to review them with you.

## 2017-11-08 ENCOUNTER — RADIANT APPOINTMENT (OUTPATIENT)
Dept: ULTRASOUND IMAGING | Facility: CLINIC | Age: 42
End: 2017-11-08
Attending: PHYSICIAN ASSISTANT
Payer: COMMERCIAL

## 2017-11-08 ENCOUNTER — TELEPHONE (OUTPATIENT)
Dept: URGENT CARE | Facility: URGENT CARE | Age: 42
End: 2017-11-08

## 2017-11-08 ENCOUNTER — TELEPHONE (OUTPATIENT)
Dept: FAMILY MEDICINE | Facility: CLINIC | Age: 42
End: 2017-11-08

## 2017-11-08 DIAGNOSIS — R10.11 RUQ ABDOMINAL PAIN: ICD-10-CM

## 2017-11-08 LAB
BACTERIA SPEC CULT: NORMAL
SPECIMEN SOURCE: NORMAL

## 2017-11-08 PROCEDURE — 76700 US EXAM ABDOM COMPLETE: CPT

## 2017-11-08 NOTE — TELEPHONE ENCOUNTER
Called patient and gave providers message/ instructions.  Patient understands this.   She has the pain off and on but is not having it right now. She is feeling ok now so patient will not go to ER now.  Educated patient that if she develops right abdominal pain or any abdominal pain to go to ER and she stated twice she understands this.   Patient will get in contact with her PCP to decide next step with fatty liver disease and gallstones.     Batsheva Asencio RN

## 2017-11-08 NOTE — TELEPHONE ENCOUNTER
Results are abnormal. Your 2 two tests for inflammation were elevated. The CRP test was really high at 64.6, up to 8 is normal. It does not tell us where the inflammation is or what is causing it. Tests for the pancreas were normal. Kidney, liver and gallbladder blood tests were normal. If feeling worse should go to the ER. Otherwise proceed with abdominal US today.     Results for orders placed or performed in visit on 11/08/17   US Abdomen Complete    Narrative    ULTRASOUND ABDOMEN COMPLETE  11/8/2017 9:12 AM    HISTORY:  Right upper quadrant abdominal pain.    FINDINGS:  Multiple mobile stones are present within the gallbladder,  the largest measuring 1.2 cm in diameter.  There is no biliary  dilatation. Increased hepatic echotexture suggests fatty infiltration.   The spleen, kidneys, as well as the visualized portions of the  pancreas, abdominal aorta and IVC appear within normal limits.      Impression    IMPRESSION:    1. Cholelithiasis.   2. Hepatic fatty infiltration.      LATONIA MEDINA MD       Spoke to pt and gave her results and she will proceed with US.       Dolores Lovett CMA

## 2017-11-08 NOTE — TELEPHONE ENCOUNTER
Patient had ultrasound done this morning.   Routing to provider to advise on these results as well before RN calls patient.     Batsheva Asencio RN

## 2017-11-08 NOTE — TELEPHONE ENCOUNTER
Filling in for provider who saw patient. I did not see patient personally  Ultrasound showed multiple gallbladder stones. And also fatty liver.   Recommend follow up with primary care provider for fatty liver disease  Gallstones could be causing her symptoms.    However I am very concerned - her CRP and ESR measures of inflammation are very high.  If she is having abdominal pain right now, recommend going to the ER. She may need additional imaging or testing.    Ivanna Curry M.D.

## 2017-11-08 NOTE — TELEPHONE ENCOUNTER
Notes Recorded by Miesha Han PA-C on 11/7/2017 at 9:03 PM    Results are abnormal. Your 2 two tests for inflammation were elevated.   The CRP test was really high at 64.6, up to 8 is normal.   It does not tell us where the inflammation is or what is causing it.   Tests for the pancreas were normal. Kidney, liver and gallbladder blood tests were normal. If feeling worse should go to the ER.    Otherwise proceed with abdominal US today.    Please call patient and notify    Miesha Han PA-C

## 2017-11-09 ENCOUNTER — OFFICE VISIT (OUTPATIENT)
Dept: FAMILY MEDICINE | Facility: CLINIC | Age: 42
End: 2017-11-09
Payer: COMMERCIAL

## 2017-11-09 VITALS
HEART RATE: 100 BPM | TEMPERATURE: 99.1 F | HEIGHT: 66 IN | SYSTOLIC BLOOD PRESSURE: 125 MMHG | DIASTOLIC BLOOD PRESSURE: 89 MMHG | BODY MASS INDEX: 32.3 KG/M2 | OXYGEN SATURATION: 99 % | WEIGHT: 201 LBS

## 2017-11-09 DIAGNOSIS — Z23 NEED FOR PROPHYLACTIC VACCINATION AND INOCULATION AGAINST INFLUENZA: ICD-10-CM

## 2017-11-09 DIAGNOSIS — K80.20 GALLSTONES: Primary | ICD-10-CM

## 2017-11-09 DIAGNOSIS — K76.0 FATTY LIVER: ICD-10-CM

## 2017-11-09 DIAGNOSIS — Z79.4 CONTROLLED TYPE 2 DIABETES MELLITUS WITH OTHER OPHTHALMIC COMPLICATION, WITH LONG-TERM CURRENT USE OF INSULIN (H): ICD-10-CM

## 2017-11-09 DIAGNOSIS — E11.39 CONTROLLED TYPE 2 DIABETES MELLITUS WITH OTHER OPHTHALMIC COMPLICATION, WITH LONG-TERM CURRENT USE OF INSULIN (H): ICD-10-CM

## 2017-11-09 PROCEDURE — 99214 OFFICE O/P EST MOD 30 MIN: CPT | Mod: 25 | Performed by: PHYSICIAN ASSISTANT

## 2017-11-09 PROCEDURE — 90471 IMMUNIZATION ADMIN: CPT | Performed by: PHYSICIAN ASSISTANT

## 2017-11-09 PROCEDURE — 90686 IIV4 VACC NO PRSV 0.5 ML IM: CPT | Performed by: PHYSICIAN ASSISTANT

## 2017-11-09 NOTE — PROGRESS NOTES
SUBJECTIVE:                                                    Concha Castanon is a 42 year old female who presents to clinic today for the following health issues:  NEW PATIENT TO ME:    F/U US results.    Patient was seen by another provider 2 days ago for RUQ pain.  US was ordered of abdomen and report reads as below:    ULTRASOUND ABDOMEN COMPLETE  11/8/2017 9:12 AM     HISTORY:  Right upper quadrant abdominal pain.     FINDINGS:  Multiple mobile stones are present within the gallbladder,  the largest measuring 1.2 cm in diameter.  There is no biliary  dilatation. Increased hepatic echotexture suggests fatty infiltration.   The spleen, kidneys, as well as the visualized portions of the  pancreas, abdominal aorta and IVC appear within normal limits.         IMPRESSION:    1. Cholelithiasis.   2. Hepatic fatty infiltration.       LATONIA MEDINA MD    Patient with diabetes type 2:  Last a1c was well above goal at 13.1. Checked in October, however patient did have insurance before then and therefore was not on medications. Now she is back on medications.   Not a great candidate for elective surgery until sugars improve. We discussed this today.     Saw diabetic educator recently. Has f/u in one month.    Morning readings are 137 for sugars so they are improving a lot per patient.      Patient has questions regarding fatty liver diagnosis:  We went over that today.   Patient is obese.  Needs to have TGs and full lipid panel done, we only have LDL which was good.   LDL was to goal, tested on 10-18-17. We discussed weight loss for fatty liver and that it can lead to cirrhosis over time if left untreated.       With regards to her RUQ pain, it comes and goes per patient. No fevers or vomiting.  No acute symptoms of infection or inflammation.  She has very large stones and therefore I suspect she will have gallbladder removed. We discussed this will require her to meet with general surgeon.  She also needs to get her  diabetes under control so she can have surgery.  For now she will avoid fat and fatty meals and spicy foods, etc. She will start trulicity soon (just got this Rx), currently she is on metformin and lantus which is bringing her sugars down from before.         Problem list and histories reviewed & adjusted, as indicated.  Additional history: as documented    Patient Active Problem List   Diagnosis     Controlled type 2 diabetes mellitus with other ophthalmic complication, with long-term current use of insulin (H)     Past Surgical History:   Procedure Laterality Date     GYN SURGERY      Laproscopy     VAG DELIV ONLY,PREV C-SECTN      2 c section       Social History   Substance Use Topics     Smoking status: Never Smoker     Smokeless tobacco: Never Used     Alcohol use Yes      Comment: OCC      Family History   Problem Relation Age of Onset     DIABETES Mother      DIABETES Father      Hypertension Father      Glaucoma No family hx of      Macular Degeneration No family hx of          Current Outpatient Prescriptions   Medication Sig Dispense Refill     ACE/ARB/ARNI NOT PRESCRIBED, INTENTIONAL, Please choose reason not prescribed, below       STATIN NOT PRESCRIBED, INTENTIONAL, Please choose reason not prescribed, below       ciprofloxacin (CIPRO) 250 MG tablet Take 1 tablet (250 mg) by mouth 2 times daily 6 tablet 0     liraglutide (VICTOZA) 18 MG/3ML soln Inject 1.2 mg Subcutaneous daily 6 mL 1     insulin pen needle (BD KAHLIL U/F) 32G X 4 MM Use 1 daily as directed. 100 each 3     dulaglutide (TRULICITY) 0.75 MG/0.5ML pen Inject 0.75 mg Subcutaneous every 7 days 2 mL 1     IRON PO        metFORMIN (GLUCOPHAGE-XR) 500 MG 24 hr tablet Take 2 tablets (1,000 mg) by mouth 2 times daily (with meals) 180 tablet 0     insulin glargine (LANTUS SOLOSTAR) 100 UNIT/ML injection Inject 37 Units Subcutaneous At Bedtime 15 mL 1     ibuprofen (ADVIL/MOTRIN) 600 MG tablet Take 1 tablet (600 mg) by mouth every 8 hours as needed  "for moderate pain 15 tablet 1     blood glucose monitoring (NO BRAND SPECIFIED) test strip Check blood sugar x 3 daily 100 strip 1     blood glucose monitoring (NO BRAND SPECIFIED) meter device kit Check blood sugar x 3 daily 1 kit 0     blood glucose (NO BRAND SPECIFIED) lancets standard Check blood sugar x 3 daily 100 each 3     blood glucose calibration (ONETOUCH ULTRA CONTROL) solution Use to calibrate blood glucose monitor as directed. 1 Bottle 0     blood glucose monitoring (ONE TOUCH ULTRA 2) meter device kit Use to test blood sugars 3 times daily or as directed. 1 kit 0     No Known Allergies      ROS:  Constitutional, HEENT, cardiovascular, pulmonary, GI, , musculoskeletal, neuro, skin, endocrine and psych systems are negative, except as otherwise noted.      OBJECTIVE:   /89  Pulse 100  Temp 99.1  F (37.3  C) (Oral)  Ht 5' 6\" (1.676 m)  Wt 201 lb (91.2 kg)  LMP 09/19/2017 (Approximate)  SpO2 99%  Breastfeeding? No  BMI 32.44 kg/m2  Body mass index is 32.44 kg/(m^2).  GENERAL: alert and no distress  RESP: lungs clear to auscultation - no rales, rhonchi or wheezes  CV: regular rate and rhythm, normal S1 S2, no S3 or S4, no murmur, click or rub, no peripheral edema and peripheral pulses strong  MS: no gross musculoskeletal defects noted, no edema  NEURO: Normal strength and tone, mentation intact and speech normal  PSYCH: mentation appears normal, affect normal/bright    Lab Results   Component Value Date    A1C 13.1 10/18/2017    A1C 9.8 08/09/2016    A1C 7.5 01/15/2016    A1C 13.4 06/03/2015     LDL Cholesterol Calculated   Date Value Ref Range Status   06/03/2015 77 <=130 mg/dL Final     LDL Cholesterol Direct   Date Value Ref Range Status   10/18/2017 79 <100 mg/dL Final     Comment:     Desirable:       <100 mg/dl   ]    ASSESSMENT/PLAN:   ASSESSMENT / PLAN:  (K80.20) Gallstones  (primary encounter diagnosis)  Comment:   Plan: GENERAL SURG ADULT REFERRAL            (E11.39,  Z79.4) " Controlled type 2 diabetes mellitus with other ophthalmic complication, with long-term current use of insulin (H)  Comment:   Plan: ACE/ARB/ARNI NOT PRESCRIBED, INTENTIONAL,,         STATIN NOT PRESCRIBED, INTENTIONAL,          Continue with medications, is going to be adding trulicity, and then f/u with diabetic ed      (Z23) Need for prophylactic vaccination and inoculation against influenza  Comment:   Plan: FLU VAC, SPLIT VIRUS IM > 3 YO (QUADRIVALENT)         [13235], Vaccine Administration, Initial         [44576]           (K76.0) Fatty liver  Comment: discussed weight loss/healthy eating, offerred nutrition referral but she declined for now  Plan: *  Billin min spent face-to-face with patient. 9 min on history, 1 on exam, 15 on discussing diagnosis and treatment plan.           Alondra Perez PA-C  [facility]    Injectable Influenza Immunization Documentation    1.  Is the person to be vaccinated sick today?   No    2. Does the person to be vaccinated have an allergy to a component   of the vaccine?   No  Egg Allergy Algorithm Link    3. Has the person to be vaccinated ever had a serious reaction   to influenza vaccine in the past?   No    4. Has the person to be vaccinated ever had Guillain-Barré syndrome?   No    Form completed by Judith Wong MA

## 2017-11-09 NOTE — NURSING NOTE
"Chief Complaint   Patient presents with     Results     F/U US results       Initial /89  Pulse 100  Temp 99.1  F (37.3  C) (Oral)  Ht 5' 6\" (1.676 m)  Wt 201 lb (91.2 kg)  LMP 09/19/2017 (Approximate)  SpO2 99%  Breastfeeding? No  BMI 32.44 kg/m2 Estimated body mass index is 32.44 kg/(m^2) as calculated from the following:    Height as of this encounter: 5' 6\" (1.676 m).    Weight as of this encounter: 201 lb (91.2 kg).  Medication Reconciliation: complete      Judith Wong MA    "

## 2017-11-13 NOTE — PROGRESS NOTES
SUBJECTIVE:   Concha Castanon is a 42 year old female who presents to clinic today for the following health issues:    Diabetes Follow-up    Patient is checking blood sugars: three times daily. Morning are lower than 100 on Lantus, Metformin and Trulicity  Blood sugar testing frequency justification: Patient modifying lifestyle changes (diet, exercise) with blood sugars  Results are as follows:       am - 9:00       lunchtime - 12:00       suppertime - 6:00        Diabetic concerns: None     Symptoms of hypoglycemia (low blood sugar): none     Paresthesias (numbness or burning in feet) or sores: No   Date of last diabetic eye exam: Couple weeks days       Amount of exercise or physical activity: 2-3 days/week for an average of 30-45 minutes    Problems taking medications regularly: No    Medication side effects: none    Diet: diabetic    Trulicity not covered; sent in a prescription for Bydureon.    URINARY TRACT SYMPTOMS  Onset: x 1 month: treated initially with Cipro x 7 days and recently with a 3 days course culture grew mixed raul and culture could not be done.    Description:   Painful urination (Dysuria): YES  Blood in urine (Hematuria): no   Delay in urine (Hesitency): no     Intensity: moderate    Progression of Symptoms:  same    Accompanying Signs & Symptoms:  Fever/chills: no   Flank pain no   Nausea and vomiting: no   Any vaginal symptoms: vaginal odor and cloudy   Abdominal/Pelvic Pain: no     History:   History of frequent UTI's: no   History of kidney stones: no   Sexually Active: YES  Possibility of pregnancy: No    Precipitating factors:               None    Therapies Tried and outcome:  Increase fluid intake    Hyperlipidemia Follow-Up      Rate your low fat/cholesterol diet?: good    Taking statin?  No    Other lipid medications/supplements?:  none      Gallstones:    Also told had a fatty Liver     Problem list and histories reviewed & adjusted, as indicated.  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, with long-term current use of insulin (H)     Past Surgical History:   Procedure Laterality Date     GYN SURGERY      Laproscopy     VAG DELIV ONLY,PREV C-SECTN      2 c section       Social History   Substance Use Topics     Smoking status: Never Smoker     Smokeless tobacco: Never Used     Alcohol use Yes      Comment: OCC      Family History   Problem Relation Age of Onset     DIABETES Mother      DIABETES Father      Hypertension Father      Glaucoma No family hx of      Macular Degeneration No family hx of          Reviewed and updated as needed this visit by Provider    ROS:  HEENT, cardiovascular, pulmonary, gi and gu systems are negative, except as otherwise noted.    OBJECTIVE:   /82  Pulse 108  Temp 99.1  F (37.3  C) (Oral)  Ht 5' 6\" (1.676 m)  Wt 197 lb (89.4 kg)  LMP 11/06/2017 (Approximate)  SpO2 98%  BMI 31.8 kg/m2  Body mass index is 31.8 kg/(m^2).  GENERAL: Fatigue looking, alert and no distress  NECK: no adenopathy and thyroid normal to palpation  RESP: lungs clear to auscultation - no rales, rhonchi or wheezes  CV: regular rate and rhythm, normal S1 S2, no S3 or S4, no murmur, click or rub  ABDOMEN: soft, nontender, no masses and bowel sounds normal  MS: no gross musculoskeletal defects noted, no edema  Diabetic foot exam: normal DP and PT pulses, no trophic changes or ulcerative lesions and normal sensory exam    Diagnostic Test Results:  none   Results for orders placed or performed in visit on 11/20/17   TSH WITH FREE T4 REFLEX   Result Value Ref Range    TSH 0.67 0.40 - 4.00 mU/L   Hemoglobin A1c   Result Value Ref Range    Hemoglobin A1C 9.6 (H) 4.3 - 6.0 %   *UA reflex to Microscopic and Culture ([place] [facility] (except [place] and [place])   Result Value Ref Range    Color Urine Yellow     Appearance Urine Slightly Cloudy     Glucose " Urine Negative NEG^Negative mg/dL    Bilirubin Urine Negative NEG^Negative    Ketones Urine Negative NEG^Negative mg/dL    Specific Gravity Urine 1.010 1.003 - 1.035    Blood Urine Large (A) NEG^Negative    pH Urine 6.0 5.0 - 7.0 pH    Protein Albumin Urine Negative NEG^Negative mg/dL    Urobilinogen Urine 0.2 0.2 - 1.0 EU/dL    Nitrite Urine Negative NEG^Negative    Leukocyte Esterase Urine Moderate (A) NEG^Negative    Source Midstream Urine    Urine Microscopic   Result Value Ref Range    WBC Urine 25-50 (A) OTO2^O - 2 /HPF    RBC Urine 10-25 (A) OTO2^O - 2 /HPF    Squamous Epithelial /LPF Urine Few FEW^Few /LPF    Bacteria Urine Moderate (A) NEG^Negative /HPF   Lipid panel reflex to direct LDL Non-fasting   Result Value Ref Range    Cholesterol 104 <200 mg/dL    Triglycerides 118 <150 mg/dL    HDL Cholesterol 31 (L) >49 mg/dL    LDL Cholesterol Calculated 49 <100 mg/dL    Non HDL Cholesterol 73 <130 mg/dL   Urine Culture Aerobic Bacterial   Result Value Ref Range    Specimen Description Midstream Urine     Culture Micro >100,000 colonies/mL  Escherichia coli   (A)        Susceptibility    Escherichia coli - LEXI     AMPICILLIN >=32 Resistant ug/mL     CEFAZOLIN* 16 Intermediate ug/mL      * Cefazolin LEXI breakpoints are for the treatment of uncomplicated urinary tract infections.  For the treatment of systemic infections, please contact the laboratory for additional testing.     CEFOXITIN 8 Sensitive ug/mL     CEFTAZIDIME <=1 Sensitive ug/mL     CEFTRIAXONE <=1 Sensitive ug/mL     CIPROFLOXACIN 0.5 Sensitive ug/mL     GENTAMICIN <=1 Sensitive ug/mL     LEVOFLOXACIN 1 Sensitive ug/mL     NITROFURANTOIN <=16 Sensitive ug/mL     TOBRAMYCIN <=1 Sensitive ug/mL     Trimethoprim/Sulfa <=1/19 Sensitive ug/mL     AMPICILLIN/SULBACTAM 16 Intermediate ug/mL     Piperacillin/Tazo <=4 Sensitive ug/mL     CEFEPIME <=1 Sensitive ug/mL     ASSESSMENT/PLAN:     (E11.65,  Z79.4) Type 2 diabetes mellitus with hyperglycemia, with  long-term current use of insulin (H)  (primary encounter diagnosis)  Comment: A1c not at goal. Started on insulin and Trulicity and blood sugars coming down. Since trending down will continue same paln, Trulicity not covered will switch to Bydureon.  Plan: Hemoglobin A1c, metFORMIN (GLUCOPHAGE-XR) 500         MG 24 hr tablet, insulin glargine (LANTUS         SOLOSTAR) 100 UNIT/ML injection, exenatide ER         (BYDUREON) 2 MG pen    (Z13.89) Screening for diabetic peripheral neuropathy  Plan: FOOT EXAM  NO CHARGE [59202.114], Urine         Microscopic    (E78.5) Hyperlipidemia LDL goal <100  Comment:   Plan: TSH WITH FREE T4 REFLEX, Urine Culture Aerobic         Bacterial, Lipid panel reflex to direct LDL         Non-fasting,     (N30.01) Acute cystitis with hematuria  Comment: UA consistent with UTI. Treated previously with Cipro will do Bactrim.  Plan: Urine Microscopic,         sulfamethoxazole-trimethoprim (BACTRIM         DS/SEPTRA DS) 800-160 MG per tablet    (R30.0) Dysuria  Comment: Bactrim  Plan: UA reflex to Microscopic and Culture ([place]         [facility] (except [place] and         [place])    (R82.90) Nonspecific finding on examination of urine  Plan: Urine Culture Aerobic Bacterial    Follow up in 1 month or sooner with concerns    Daniel Pina MD  [facility]

## 2017-11-20 ENCOUNTER — TELEPHONE (OUTPATIENT)
Dept: FAMILY MEDICINE | Facility: CLINIC | Age: 42
End: 2017-11-20

## 2017-11-20 ENCOUNTER — OFFICE VISIT (OUTPATIENT)
Dept: FAMILY MEDICINE | Facility: CLINIC | Age: 42
End: 2017-11-20
Payer: COMMERCIAL

## 2017-11-20 VITALS
OXYGEN SATURATION: 98 % | TEMPERATURE: 99.1 F | SYSTOLIC BLOOD PRESSURE: 126 MMHG | DIASTOLIC BLOOD PRESSURE: 82 MMHG | BODY MASS INDEX: 31.66 KG/M2 | HEART RATE: 108 BPM | HEIGHT: 66 IN | WEIGHT: 197 LBS

## 2017-11-20 DIAGNOSIS — Z79.4 TYPE 2 DIABETES MELLITUS WITH HYPERGLYCEMIA, WITH LONG-TERM CURRENT USE OF INSULIN (H): Primary | ICD-10-CM

## 2017-11-20 DIAGNOSIS — N30.01 ACUTE CYSTITIS WITH HEMATURIA: ICD-10-CM

## 2017-11-20 DIAGNOSIS — R82.90 NONSPECIFIC FINDING ON EXAMINATION OF URINE: ICD-10-CM

## 2017-11-20 DIAGNOSIS — Z13.89 SCREENING FOR DIABETIC PERIPHERAL NEUROPATHY: ICD-10-CM

## 2017-11-20 DIAGNOSIS — R30.0 DYSURIA: ICD-10-CM

## 2017-11-20 DIAGNOSIS — E78.5 HYPERLIPIDEMIA LDL GOAL <100: ICD-10-CM

## 2017-11-20 DIAGNOSIS — E11.65 TYPE 2 DIABETES MELLITUS WITH HYPERGLYCEMIA, WITH LONG-TERM CURRENT USE OF INSULIN (H): Primary | ICD-10-CM

## 2017-11-20 LAB
ALBUMIN UR-MCNC: NEGATIVE MG/DL
APPEARANCE UR: ABNORMAL
BACTERIA #/AREA URNS HPF: ABNORMAL /HPF
BILIRUB UR QL STRIP: NEGATIVE
CHOLEST SERPL-MCNC: 104 MG/DL
COLOR UR AUTO: YELLOW
GLUCOSE UR STRIP-MCNC: NEGATIVE MG/DL
HBA1C MFR BLD: 9.6 % (ref 4.3–6)
HDLC SERPL-MCNC: 31 MG/DL
HGB UR QL STRIP: ABNORMAL
KETONES UR STRIP-MCNC: NEGATIVE MG/DL
LDLC SERPL CALC-MCNC: 49 MG/DL
LEUKOCYTE ESTERASE UR QL STRIP: ABNORMAL
NITRATE UR QL: NEGATIVE
NON-SQ EPI CELLS #/AREA URNS LPF: ABNORMAL /LPF
NONHDLC SERPL-MCNC: 73 MG/DL
PH UR STRIP: 6 PH (ref 5–7)
RBC #/AREA URNS AUTO: ABNORMAL /HPF
SOURCE: ABNORMAL
SP GR UR STRIP: 1.01 (ref 1–1.03)
TRIGL SERPL-MCNC: 118 MG/DL
TSH SERPL DL<=0.005 MIU/L-ACNC: 0.67 MU/L (ref 0.4–4)
UROBILINOGEN UR STRIP-ACNC: 0.2 EU/DL (ref 0.2–1)
WBC #/AREA URNS AUTO: ABNORMAL /HPF

## 2017-11-20 PROCEDURE — 84443 ASSAY THYROID STIM HORMONE: CPT | Performed by: FAMILY MEDICINE

## 2017-11-20 PROCEDURE — 81001 URINALYSIS AUTO W/SCOPE: CPT | Performed by: FAMILY MEDICINE

## 2017-11-20 PROCEDURE — 87086 URINE CULTURE/COLONY COUNT: CPT | Performed by: FAMILY MEDICINE

## 2017-11-20 PROCEDURE — 87186 SC STD MICRODIL/AGAR DIL: CPT | Performed by: FAMILY MEDICINE

## 2017-11-20 PROCEDURE — 99214 OFFICE O/P EST MOD 30 MIN: CPT | Performed by: FAMILY MEDICINE

## 2017-11-20 PROCEDURE — 87088 URINE BACTERIA CULTURE: CPT | Performed by: FAMILY MEDICINE

## 2017-11-20 PROCEDURE — 99207 C FOOT EXAM  NO CHARGE: CPT | Performed by: FAMILY MEDICINE

## 2017-11-20 PROCEDURE — 36415 COLL VENOUS BLD VENIPUNCTURE: CPT | Performed by: FAMILY MEDICINE

## 2017-11-20 PROCEDURE — 80061 LIPID PANEL: CPT | Performed by: FAMILY MEDICINE

## 2017-11-20 PROCEDURE — 83036 HEMOGLOBIN GLYCOSYLATED A1C: CPT | Performed by: FAMILY MEDICINE

## 2017-11-20 RX ORDER — METFORMIN HCL 500 MG
500 TABLET, EXTENDED RELEASE 24 HR ORAL 2 TIMES DAILY WITH MEALS
Qty: 360 TABLET | Refills: 0 | Status: SHIPPED | OUTPATIENT
Start: 2017-11-20 | End: 2018-03-27

## 2017-11-20 RX ORDER — BLOOD-GLUCOSE METER
EACH MISCELLANEOUS
Qty: 1 KIT | Refills: 0 | Status: CANCELLED | OUTPATIENT
Start: 2017-11-20

## 2017-11-20 ASSESSMENT — PAIN SCALES - GENERAL: PAINLEVEL: NO PAIN (0)

## 2017-11-20 NOTE — TELEPHONE ENCOUNTER
[facility] Prior Authorization Team   Phone: [phone]  Fax: [phone]    EPA denied    Medication: dulaglutide (TRULICITY) 0.75 MG/0.5ML pen  Insurance Company: [employer] - Phone [phone] Fax [phone]  Pharmacy Filling the Rx: [place], [place] - [address], [address]  Filling Pharmacy Phone: [phone]  Filling Pharmacy Fax: [phone]  Start Date: 11/20/2017

## 2017-11-20 NOTE — PATIENT INSTRUCTIONS
[facility]    If you have any questions regarding to your visit please contact your care team:       Team Purple:   Clinic Hours Telephone Number   Dr. Breonna Lombardo   7am-7pm  Monday - Thursday   7am-5pm  Fridays  [phone]  (Appointment scheduling available 24/7)    Questions about your Visit?   Team Line:  [phone]   Urgent Care - [place] and [facility] - 11am-9pm Monday-Friday Saturday-Sunday- 9am-5pm   [place] - 5pm-9pm Monday-Friday Saturday-Sunday- 9am-5pm  [phone] - [facility]  [phone] - [place]       What options do I have for visits at the clinic other than the traditional office visit?  To expand how we care for you, many of our providers are utilizing electronic visits (e-visits) and telephone visits, when medically appropriate, for interactions with their patients rather than a visit in the clinic.   We also offer nurse visits for many medical concerns. Just like any other service, we will bill your insurance company for this type of visit based on time spent on the phone with your provider. Not all insurance companies cover these visits. Please check with your medical insurance if this type of visit is covered. You will be responsible for any charges that are not paid by your insurance.      E-visits via [employer]:  generally incur a $35.00 fee.  Telephone visits:  Time spent on the phone: *charged based on time that is spent on the phone in increments of 10 minutes. Estimated cost:   5-10 mins $30.00   11-20 mins. $59.00   21-30 mins. $85.00     Use TEEspyhart (secure email communication and access to your chart) to send your primary care provider a message or make an appointment. Ask someone on your Team how to sign up for [employer].  For a Price Quote for your services, please call our Consumer Price Line at [phone].  As always, Thank you for trusting us with your health care  needs!    Discharge by MATTHEW PIERCE

## 2017-11-20 NOTE — NURSING NOTE
"Chief Complaint   Patient presents with     Diabetes     Health Maintenance     A1C       Initial /82  Pulse 108  Temp 99.1  F (37.3  C) (Oral)  Ht 5' 6\" (1.676 m)  Wt 197 lb (89.4 kg)  LMP 11/06/2017 (Approximate)  SpO2 98%  BMI 31.8 kg/m2 Estimated body mass index is 31.8 kg/(m^2) as calculated from the following:    Height as of this encounter: 5' 6\" (1.676 m).    Weight as of this encounter: 197 lb (89.4 kg).  Medication Reconciliation: complete     Martha Agustin MA       "

## 2017-11-20 NOTE — MR AVS SNAPSHOT
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 Breonna Lombardo   7am-7pm  Monday - Thursday   7am-5pm  Fridays  [phone]  (Appointment scheduling available 24/7)    Questions about your Visit?   Team Line:  [phone]   Urgent Care - [place] and [facility] - 11am-9pm Monday-Friday Saturday-Sunday- 9am-5pm   [place] - 5pm-9pm Monday-Friday Saturday-Sunday- 9am-5pm  [phone] - [place]   [phone] - [place]       What options do I have for visits at the clinic other than the traditional office visit?  To expand how we care for you, many of our providers are utilizing electronic visits (e-visits) and telephone visits, when medically appropriate, for interactions with their patients rather than a visit in the clinic.   We also offer nurse visits for many medical concerns. Just like any other service, we will bill your insurance company for this type of visit based on time spent on the phone with your provider. Not all  insurance companies cover these visits. Please check with your medical insurance if this type of visit is covered. You will be responsible for any charges that are not paid by your insurance.      E-visits via Jiahehart:  generally incur a $35.00 fee.  Telephone visits:  Time spent on the phone: *charged based on time that is spent on the phone in increments of 10 minutes. Estimated cost:   5-10 mins $30.00   11-20 mins. $59.00   21-30 mins. $85.00     Use [employer] (secure email communication and access to your chart) to send your primary care provider a message or make an appointment. Ask someone on your Team how to sign up for [employer].  For a Price Quote for your services, please call our [employer] Line at [phone].  As always, Thank you for trusting us with your health care needs!    Discharge by MATTHEW PIERCE             Follow-ups after your visit        Follow-up notes from your care team     Return in about 1 year (around 11/20/2018) for Routine Visit.      Your next 10 appointments already scheduled     Nov 30, 2017  9:30 AM CST   Diabetic Education with AN DIABETIC ED RESOURCE   [facility] ([facility])    [address] [address] [phone]              Who to contact     If you have questions or need follow up information about today's clinic visit or your schedule please contact [facility] directly at [phone].  Normal or non-critical lab and imaging results will be communicated to you by MyChart, letter or phone within 4 business days after the clinic has received the results. If you do not hear from us within 7 days, please contact the clinic through Jiahehart or phone. If you have a critical or abnormal lab result, we will notify you by phone as soon as possible.  Submit refill requests through SavvySync or call your pharmacy and they will forward the refill request to us. Please allow 3 business days for your refill to be completed.        "   Additional Information About Your Visit        Care EveryWhere ID     This is your Care EveryWhere ID. This could be used by other organizations to access your [facility] medical records  [id number]        Your Vitals Were     Pulse Temperature Height Last Period Pulse Oximetry BMI (Body Mass Index)    108 99.1  F (37.3  C) (Oral) 5' 6\" (1.676 m) 11/06/2017 (Approximate) 98% 31.8 kg/m2       Blood Pressure from Last 3 Encounters:   11/20/17 126/82   11/09/17 125/89   11/07/17 129/81    Weight from Last 3 Encounters:   11/20/17 197 lb (89.4 kg)   11/09/17 201 lb (91.2 kg)   11/07/17 200 lb 3.2 oz (90.8 kg)              We Performed the Following     *UA reflex to Microscopic and Culture ([place] and [facility] (except [place] and [place])     FOOT EXAM  NO CHARGE [59555.114]     Hemoglobin A1c     Lipid panel reflex to direct LDL Fasting     TSH WITH FREE T4 REFLEX     Urine Culture Aerobic Bacterial     Urine Microscopic          Today's Medication Changes          These changes are accurate as of: 11/20/17 10:57 AM.  If you have any questions, ask your nurse or doctor.               These medicines have changed or have updated prescriptions.        Dose/Directions    metFORMIN 500 MG 24 hr tablet   Commonly known as:  GLUCOPHAGE-XR   This may have changed:  See the new instructions.   Used for:  Type 2 diabetes mellitus with hyperglycemia, with long-term current use of insulin (H)   Changed by:  Daniel Pina MD        Dose:  500 mg   Take 1 tablet (500 mg) by mouth 2 times daily (with meals)   Quantity:  360 tablet   Refills:  0         Stop taking these medicines if you haven't already. Please contact your care team if you have questions.     liraglutide 18 MG/3ML soln   Commonly known as:  VICTOZA   Stopped by:  Daniel Pina MD                Where to get your medicines      These medications were sent to [facility] [address], [address]  [address] [address], [address], [facility] [address]     Phone:  [phone]     insulin glargine 100 UNIT/ML injection    metFORMIN 500 MG 24 hr tablet         Call your pharmacy to confirm that your medication is ready for pickup. It may take up to 24 hours for them to receive the prescription. If the prescription is not ready within 3 business days, please contact your clinic or your provider.     We will let you know when these medications are ready. If you don't hear back within 3 business days, please contact us.     dulaglutide 0.75 MG/0.5ML pen                Primary Care Provider Office Phone # Fax #    Daniel Pina [phone]       [address] [facility] [address]        Equal Access to Services     [facility] DIANA : Hadii ramesh olivao Soomaali, waaxda luqadaha, qaybta kaalmada adeegyamina, shaista brown . So [facility] [phone].    ATENCIÓN: Si habla español, tiene a easton disposición servicios gratuitos de asistencia lingüística. [facility] [phone].    We comply with applicable federal civil rights laws and [place] laws. We do not discriminate on the basis of race, color, national origin, age, disability, sex, sexual orientation, or gender identity.            Thank you!     Thank you for choosing [facility]  for your care. Our goal is always to provide you with excellent care. Hearing back from our patients is one way we can continue to improve our services. Please take a few minutes to complete the written survey that you may receive in the mail after your visit with us. Thank you!             Your Updated Medication List - Protect others around you: Learn how to safely use, store and throw away your medicines at www.disposemymeds.org.          This list is accurate as of: 11/20/17 10:57 AM.  Always use your most recent med list.                   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    Type 2 diabetes mellitus with hyperglycemia, with long-term current use of insulin (H)       blood glucose lancets standard    no brand specified    100 each    Check blood sugar x 3 daily    Type 2 diabetes mellitus with hyperglycemia, with long-term current use of insulin (H)       * blood glucose monitoring meter device kit    no brand specified    1 kit    Check blood sugar x 3 daily    Type 2 diabetes mellitus with hyperglycemia, with long-term current use of insulin (H)       * blood glucose monitoring meter device kit     1 kit    Use to test blood sugars 3 times daily or as directed.    Type 2 diabetes mellitus with hyperglycemia, with long-term current use of insulin (H)       blood glucose monitoring test strip    no brand specified    100 strip    Check blood sugar x 3 daily    Type 2 diabetes mellitus with hyperglycemia, with long-term current use of insulin (H)       ciprofloxacin 250 MG tablet    CIPRO    6 tablet    Take 1 tablet (250 mg) by mouth 2 times daily    Dysuria       dulaglutide 0.75 MG/0.5ML pen    TRULICITY    2 mL    Inject 0.75 mg Subcutaneous every 7 days    Type 2 diabetes mellitus with complication, with long-term current use of insulin (H)       ibuprofen 600 MG tablet    ADVIL/MOTRIN    15 tablet    Take 1 tablet (600 mg) by mouth every 8 hours as needed for moderate pain    Type 2 diabetes mellitus with hyperglycemia, with long-term current use of insulin (H)       insulin glargine 100 UNIT/ML injection    LANTUS SOLOSTAR    15 mL    Inject 37 Units Subcutaneous At Bedtime    Type 2 diabetes mellitus with hyperglycemia, with long-term current use of insulin (H)       insulin pen needle 32G X 4 MM    BD KAHLIL  U/F    100 each    Use 1 daily as directed.    Diabetes mellitus, type 2 (H)       IRON PO           metFORMIN 500 MG 24 hr tablet    GLUCOPHAGE-XR    360 tablet    Take 1 tablet (500 mg) by mouth 2 times daily (with meals)    Type 2 diabetes mellitus with hyperglycemia, with long-term current use of insulin (H)       STATIN NOT PRESCRIBED (INTENTIONAL)      Please choose reason not prescribed, below    Controlled type 2 diabetes mellitus with other ophthalmic complication, with long-term current use of insulin (H)       * Notice:  This list has 2 medication(s) that are the same as other medications prescribed for you. Read the directions carefully, and ask your doctor or other care provider to review them with you.

## 2017-11-21 LAB
BACTERIA SPEC CULT: ABNORMAL
SPECIMEN SOURCE: ABNORMAL

## 2017-11-22 RX ORDER — SULFAMETHOXAZOLE/TRIMETHOPRIM 800-160 MG
1 TABLET ORAL 2 TIMES DAILY
Qty: 14 TABLET | Refills: 0 | Status: SHIPPED | OUTPATIENT
Start: 2017-11-22

## 2017-11-30 ENCOUNTER — ALLIED HEALTH/NURSE VISIT (OUTPATIENT)
Dept: EDUCATION SERVICES | Facility: CLINIC | Age: 42
End: 2017-11-30
Payer: COMMERCIAL

## 2017-11-30 DIAGNOSIS — Z79.4 CONTROLLED TYPE 2 DIABETES MELLITUS WITH OTHER OPHTHALMIC COMPLICATION, WITH LONG-TERM CURRENT USE OF INSULIN (H): Primary | ICD-10-CM

## 2017-11-30 DIAGNOSIS — E11.39 CONTROLLED TYPE 2 DIABETES MELLITUS WITH OTHER OPHTHALMIC COMPLICATION, WITH LONG-TERM CURRENT USE OF INSULIN (H): Primary | ICD-10-CM

## 2017-11-30 PROCEDURE — 99207 ZZC DROP WITH A PROCEDURE: CPT

## 2017-11-30 PROCEDURE — G0108 DIAB MANAGE TRN  PER INDIV: HCPCS

## 2017-11-30 RX ORDER — LANCETS 33 GAUGE
1 EACH MISCELLANEOUS 3 TIMES DAILY
Qty: 200 EACH | Refills: 11 | Status: SHIPPED | OUTPATIENT
Start: 2017-11-30

## 2017-11-30 NOTE — PROGRESS NOTES
Diabetes Self Management Training: Follow-up Visit    Concha Castanon presents today for education, evaluation of glucose control and modification of medication(s) related to Type 2 diabetes.    She is accompanied by self    Patient's diabetes management related comments/concerns: feels much better    Patient would like this visit to be focused around the following diabetes-related behaviors and goals: Healthy Eating, Being Active, Monitoring and Taking Medication    ASSESSMENT:  Patient Problem List reviewed for relevant medical history and current medical status.    Current Diabetes Management per Patient:  Taking diabetes medications?   yes:     Diabetes Medication(s)     Biguanides Sig    metFORMIN (GLUCOPHAGE-XR) 500 MG 24 hr tablet Take 1 tablet (500 mg) by mouth 2 times daily (with meals)    Insulin Sig    insulin glargine (LANTUS SOLOSTAR) 100 UNIT/ML injection Inject 37 Units Subcutaneous At Bedtime    Incretin Mimetic Agents (GLP-1 Receptor Agonists) Sig    exenatide ER (BYDUREON) 2 MG pen Inject 2 mg Subcutaneous once a week      , Oral Medications: Metformin - Dose:  mg takes 1000 mg , Time: breakfast and supper, Injectable Medications: Lantus 0-0-0-35 and Bydureon 2 mg once weekly has not started yet    *Abbreviated insulin dose documentation key: Insulin Trade Name (ynisintey-xtekq-hddznv-bedtime) - i.e. Humalog 5-5-5-0 (Humalog 5 units at breakfast, 5 units at lunch, and 5 units at dinner).    Patient glucose self monitoring as follows: two times daily.   BG meter: One Touch Ultra 2 meter  BG results: see blood glucose log attached to this encounter     BG values are: In goal  Patient's most recent   Lab Results   Component Value Date    A1C 9.6 11/20/2017    is not meeting goal of <7.0    Nutrition:  Patient eats 3 meals per day, she has gallbladder problems now.      Breakfast - bread and eggs or cold cuts, rocha, tea or coffee.  Smoothie with almond milk, protein powder and banana.    Lunch  "- rice or bulgur, (high fiber grain/wheat), some sort of protein, fish or chicken   Dinner - same as lunch, veggies.     Snacks - beef jerkey, apple.      Beverages: tea and water, mostly water.      Cultural/Restoration diet restrictions: No     Biggest Challenge to Healthy Eating: none    Physical Activity:    Type: walking every day    Diabetes Complications:  Not discussed today.    Vitals:  LMP 11/06/2017 (Approximate)  Estimated body mass index is 31.8 kg/(m^2) as calculated from the following:    Height as of 11/20/17: 1.676 m (5' 6\").    Weight as of 11/20/17: 89.4 kg (197 lb).   Last 3 BP:   BP Readings from Last 3 Encounters:   11/20/17 126/82   11/09/17 125/89   11/07/17 129/81       History   Smoking Status     Never Smoker   Smokeless Tobacco     Never Used       Labs:  Lab Results   Component Value Date    A1C 9.6 11/20/2017     Lab Results   Component Value Date     11/07/2017     Lab Results   Component Value Date    LDL 49 11/20/2017     HDL Cholesterol   Date Value Ref Range Status   11/20/2017 31 (L) >49 mg/dL Final   ]  GFR Estimate   Date Value Ref Range Status   11/07/2017 >90 >60 mL/min/1.7m2 Final     Comment:     Non  GFR Calc     GFR Estimate If Black   Date Value Ref Range Status   11/07/2017 >90 >60 mL/min/1.7m2 Final     Comment:      GFR Calc     Lab Results   Component Value Date    CR 0.69 11/07/2017     No results found for: MICROALBUMIN    Health Beliefs and Attitudes:   Patient Activation Measure Survey Score:  RADHA Score (Last Two) 10/19/2017   RADHA Raw Score 40   Activation Score 100   RADHA Level 4       Stage of Change: ACTION (Actively working towards change)    Progress toward meeting diabetes-related behavioral goals:    GOALS % Met Goal   Healthy Eating  100   Physical Activity  100   Monitoring  100   Medication Taking  100   Problem Solving     Healthy Coping     Risk Reduction             Diabetes knowledge and skills assessment: "     Patient is knowledgeable in diabetes management concepts related to: Healthy Eating, Being Active, Monitoring and Taking Medication    Patient needs further education on the following diabetes management concepts: Healthy Eating, Being Active, Monitoring and Taking Medication    Barriers to Learning Assessment: No Barriers identified    Based on learning assessment above, most appropriate setting for further diabetes education would be: Individual setting.    INTERVENTION:    Education provided today on:  AADE Self-Care Behaviors:  Healthy Eating: carbohydrate counting, consistency in amount, composition, and timing of food intake, weight reduction, heart healthy diet, eating out, portion control, plate planning method and label reading  Taking Medication: action of prescribed medication, drawing up, administering and storing injectable diabetes medications, proper site selection and rotation for injections, side effects of prescribed medications and when to take medications    Opportunities for ongoing education and support in diabetes-self management were discussed.    Pt verbalized understanding of concepts discussed and recommendations provided today.       Education Materials Provided:  Carbohydrate Counting and My Plate Planner    PLAN:  See Patient Instructions for co-developed, patient-stated behavior change goals.  AVS printed and provided to patient today.    FOLLOW-UP:  Follow-up appointment , will call in Feb or March  Chart routed to referring provider.    Ongoing plan for education and support: Follow-up visit with diabetes educator in [place]    Pebbles Mehta RN/[facility] Diabetes Educator    Time Spent: 30 minutes  Encounter Type: Individual    Any diabetes medication dose changes were made via the CDE Protocol and Collaborative Practice Agreement with the patient's primary care provider. A copy of this encounter was shared with the provider.

## 2017-11-30 NOTE — MR AVS SNAPSHOT
After Visit Summary   11/30/2017    Concha Castanon    MRN: [id number]           Patient Information     Date Of Birth          1975        Visit Information        Provider Department      11/30/2017 9:30 AM AN DIABETIC ED [facility]        Today's Diagnoses     Controlled type 2 diabetes mellitus with other ophthalmic complication, with long-term current use of insulin (H)    -  1      Care Instructions    My Diabetes Care Goals:    Healthy Eating: less fats in your foods, and increase fibers.     Being Active: continue to walk 3 times per week about 30 minutes.    Monitoring: check in the mornings, if you wish check 2 hrs after a meal. < 180    Taking Medication: Metformin  mg take 2 pills twice per day  Bydureon 2 mg take weekly.  Let me know if you have any issues.  Lantus take 33 units tonight and slowly decrease by 2 units every 3 days once you begin Bydureon.    Follow up:  Follow-up diabetes education appointment in 3 months, Feb or March.  Can do this on my chart. .      Bring blood glucose meter and logbook with you to all doctor and follow-up appointments.     [place] [facility] for the [facility] Area:  For Your Diabetes Education and Nutrition Appointments Call:  [phone]   For Diabetes Education or Nutrition Related Questions:   Phone: [phone]  E-mail: [email or web address]  Fax: [phone]   If you need a medication refill please contact your pharmacy. Please allow 3 business days for your refills to be completed.    Instructions for emailing the Diabetes Educators    If you need to communicate a non-urgent message to a Diabetes Educator via email, please send to [email or web address].    Please follow the following email guidelines:    Subject line: Secure: your clinic name (example: Secure: [facility])  In the email please include: First name, middle initial, last name and date of birth.    We will be  in touch with you within one (1) business day.             Follow-ups after your visit        Who to contact     If you have questions or need follow up information about today's clinic visit or your schedule please contact [facility] directly at [phone].  Normal or non-critical lab and imaging results will be communicated to you by MyChart, letter or phone within 4 business days after the clinic has received the results. If you do not hear from us within 7 days, please contact the clinic through MyChart or phone. If you have a critical or abnormal lab result, we will notify you by phone as soon as possible.  Submit refill requests through [employer] or call your pharmacy and they will forward the refill request to us. Please allow 3 business days for your refill to be completed.          Additional Information About Your Visit        Care EveryWhere ID     This is your Care EveryWhere ID. This could be used by other organizations to access your [place] medical records  [id number]        Your Vitals Were     Last Period                   11/06/2017 (Approximate)            Blood Pressure from Last 3 Encounters:   11/20/17 126/82   11/09/17 125/89   11/07/17 129/81    Weight from Last 3 Encounters:   11/20/17 89.4 kg (197 lb)   11/09/17 91.2 kg (201 lb)   11/07/17 90.8 kg (200 lb 3.2 oz)              Today, you had the following     No orders found for display         Today's Medication Changes          These changes are accurate as of: 11/30/17 10:14 AM.  If you have any questions, ask your nurse or doctor.               Start taking these medicines.        Dose/Directions    ONETOUCH DELICA LANCETS 33G Misc   Used for:  Controlled type 2 diabetes mellitus with other ophthalmic complication, with long-term current use of insulin (H)        Dose:  1 Box   1 Box 3 times daily   Quantity:  200 each   Refills:  11         These medicines have changed or have updated prescriptions.         Dose/Directions    blood glucose monitoring test strip   Commonly known as:  ONETOUCH ULTRA   This may have changed:  additional instructions   Used for:  Controlled type 2 diabetes mellitus with other ophthalmic complication, with long-term current use of insulin (H)        Use to test blood sugar 3 times daily or as directed.   Quantity:  300 strip   Refills:  11         Stop taking these medicines if you haven't already. Please contact your care team if you have questions.     blood glucose lancets standard   Commonly known as:  no brand specified           blood glucose monitoring meter device kit                Where to get your medicines      These medications were sent to [facility] [place], [place] - [address] [address], [address]  [address] [address], [address], [facility] [address]     Phone:  [phone]     blood glucose monitoring test strip    ONETOUCH DELICA LANCETS 33G [facility] [place]                Primary Care Provider Office Phone # Fax #    Daniel Pina [phone]       [address] [address]        Equal Access to Services     RUDOLPH ORTIZ AH: Hadii aad ku hadasho Soomaali, waaxda luqadaha, qaybta kaalmada adeegyada, waxay idiin hayaan adeeg kharagabriela la'seema ah. So [facility] [phone].    ATENCIÓN: Si habla español, tiene a easton disposición servicios gratuitos de asistencia lingüística. [facility] [phone].    We comply with applicable federal civil rights laws and [place] laws. We do not discriminate on the basis of race, color, national origin, age, disability, sex, sexual orientation, or gender identity.            Thank you!     Thank you for choosing [facility]  for your care. Our goal is always to provide you with excellent care. Hearing back from our patients is one way we can continue to improve our services. Please take a few minutes to complete the written survey that you may receive in the mail after your visit with us. Thank you!              Your Updated Medication List - Protect others around you: Learn how to safely use, store and throw away your medicines at www.disposemymeds.org.          This list is accurate as of: 11/30/17 10:14 AM.  Always use your most recent med list.                   Brand Name Dispense Instructions for use Diagnosis    ACE/ARB/ARNI NOT PRESCRIBED (INTENTIONAL)      Please choose reason not prescribed, below    Controlled type 2 diabetes mellitus with other ophthalmic complication, with long-term current use of insulin (H)       blood glucose calibration solution     1 Bottle    Use to calibrate blood glucose monitor as directed.    Type 2 diabetes mellitus with hyperglycemia, with long-term current use of insulin (H)       blood glucose monitoring test strip    ONETOUCH ULTRA    300 strip    Use to test blood sugar 3 times daily or as directed.    Controlled type 2 diabetes mellitus with other ophthalmic complication, with long-term current use of insulin (H)       ciprofloxacin 250 MG tablet    CIPRO    6 tablet    Take 1 tablet (250 mg) by mouth 2 times daily    Dysuria       exenatide ER 2 MG pen    BYDUREON    4 kit    Inject 2 mg Subcutaneous once a week    Type 2 diabetes mellitus with hyperglycemia, with long-term current use of insulin (H)       ibuprofen 600 MG tablet    ADVIL/MOTRIN    15 tablet    Take 1 tablet (600 mg) by mouth every 8 hours as needed for moderate pain    Type 2 diabetes mellitus with hyperglycemia, with long-term current use of insulin (H)       insulin glargine 100 UNIT/ML injection    LANTUS SOLOSTAR    15 mL    Inject 37 Units Subcutaneous At Bedtime    Type 2 diabetes mellitus with hyperglycemia, with long-term current use of insulin (H)       insulin pen needle 32G X 4 MM    BD KAHLIL U/F    100 each    Use 1 daily as directed.    Diabetes mellitus, type 2 (H)       IRON PO           metFORMIN 500 MG 24 hr tablet    GLUCOPHAGE-XR    360 tablet    Take 1 tablet (500 mg) by mouth 2  times daily (with meals)    Type 2 diabetes mellitus with hyperglycemia, with long-term current use of insulin (H)       ONETOUCH DELICA LANCETS 33G Misc     200 each    1 Box 3 times daily    Controlled type 2 diabetes mellitus with other ophthalmic complication, with long-term current use of insulin (H)       STATIN NOT PRESCRIBED (INTENTIONAL)      Please choose reason not prescribed, below    Controlled type 2 diabetes mellitus with other ophthalmic complication, with long-term current use of insulin (H)       sulfamethoxazole-trimethoprim 800-160 MG per tablet    BACTRIM DS/SEPTRA DS    14 tablet    Take 1 tablet by mouth 2 times daily    Acute cystitis with hematuria

## 2017-11-30 NOTE — PATIENT INSTRUCTIONS
My Diabetes Care Goals:    Healthy Eating: less fats in your foods, and increase fibers.     Being Active: continue to walk 3 times per week about 30 minutes.    Monitoring: check in the mornings, if you wish check 2 hrs after a meal. < 180    Taking Medication: Metformin  mg take 2 pills twice per day  Bydureon 2 mg take weekly.  Let me know if you have any issues.  Lantus take 33 units tonight and slowly decrease by 2 units every 3 days once you begin Bydureon.    Follow up:  Follow-up diabetes education appointment in 3 months, Feb or March.  Can do this on my chart. .      Bring blood glucose meter and logbook with you to all doctor and follow-up appointments.     [place] [facility] for the [facility] Area:  For Your Diabetes Education and Nutrition Appointments Call:  [phone]   For Diabetes Education or Nutrition Related Questions:   Phone: [phone]  E-mail: [email or web address]  Fax: [phone]   If you need a medication refill please contact your pharmacy. Please allow 3 business days for your refills to be completed.    Instructions for emailing the Diabetes Educators    If you need to communicate a non-urgent message to a Diabetes Educator via email, please send to [email or web address].    Please follow the following email guidelines:    Subject line: Secure: your clinic name (example: Secure: [facility])  In the email please include: First name, middle initial, last name and date of birth.    We will be in touch with you within one (1) business day.

## 2017-12-06 ENCOUNTER — TELEPHONE (OUTPATIENT)
Dept: FAMILY MEDICINE | Facility: CLINIC | Age: 42
End: 2017-12-06

## 2017-12-06 NOTE — TELEPHONE ENCOUNTER
Panel Management Review      Patient has the following on her problem list:     Diabetes    ASA: Not Required     Last A1C  Lab Results   Component Value Date    A1C 9.6 11/20/2017    A1C 13.1 10/18/2017    A1C 9.8 08/09/2016    A1C 7.5 01/15/2016    A1C 13.4 06/03/2015     A1C tested: MONITOR    Last LDL:    Lab Results   Component Value Date    CHOL 104 11/20/2017     Lab Results   Component Value Date    HDL 31 11/20/2017     Lab Results   Component Value Date    LDL 49 11/20/2017     Lab Results   Component Value Date    TRIG 118 11/20/2017     No results found for: CHOLHDLRATIO  Lab Results   Component Value Date    NHDL 73 11/20/2017       Is the patient on a Statin? NO             Is the patient on Aspirin? NO    Medications     HMG CoA Reductase Inhibitors    STATIN NOT PRESCRIBED, INTENTIONAL,          Last three blood pressure readings:  BP Readings from Last 3 Encounters:   11/20/17 126/82   11/09/17 125/89   11/07/17 129/81       Date of last diabetes office visit: 11/20/2017     Tobacco History:     History   Smoking Status     Never Smoker   Smokeless Tobacco     Never Used             Composite cancer screening  Chart review shows that this patient is due/due soon for the following None  Summary:    Patient is due/failing the following:   None    Action needed:   No actions needed as pt is seeing another [facility] provider and recently just had a diabetes appt.    Type of outreach:    None    Questions for provider review:    None                                                                                                                                    Judith Wong MA     Chart routed to closed.

## 2017-12-12 ENCOUNTER — TELEPHONE (OUTPATIENT)
Dept: FAMILY MEDICINE | Facility: CLINIC | Age: 42
End: 2017-12-12

## 2017-12-12 NOTE — TELEPHONE ENCOUNTER
Reason for Call:  Other prescription    Detailed comments: patient is asking to see if she could get 3 months worth at a time for all of her diabetic medications(metformin, strips, lancets etc). Patient would like these sent to her pharmacy listed above    Phone Number Patient can be reached at: Cell number on file:    Telephone Information:   Mobile [phone]       Best Time: any time    Can we leave a detailed message on this number? YES    Call taken on 12/12/2017 at 4:21 PM by Corinne Orr

## 2017-12-13 NOTE — TELEPHONE ENCOUNTER
Spoke with pharmacy, patients insurance does not allow 90 day supplies at retail pharmacy.  Patient will need to contact her insurance if she would like to discuss how to receive 90 day supplies of medications.   Detailed message left for patient with message above, advised patient to call RN hotline if any questions.   Elina Wilcox RN

## 2018-02-15 ENCOUNTER — TELEPHONE (OUTPATIENT)
Dept: FAMILY MEDICINE | Facility: CLINIC | Age: 43
End: 2018-02-15

## 2018-02-15 NOTE — TELEPHONE ENCOUNTER
Called patient and left VM to call clinic to set up appointment with provider.  Naomi VERDUZCO CMA ([facility])

## 2018-02-22 NOTE — TELEPHONE ENCOUNTER
Patient returned call to [employer]. Informed of message. Patient verbally understand.  Rula Wong MA

## 2018-03-19 ENCOUNTER — TELEPHONE (OUTPATIENT)
Dept: FAMILY MEDICINE | Facility: CLINIC | Age: 43
End: 2018-03-19

## 2018-03-19 NOTE — TELEPHONE ENCOUNTER
Panel Management Review      Patient has the following on her problem list:     Diabetes    ASA: Failed    Last A1C  Lab Results   Component Value Date    A1C 9.6 11/20/2017    A1C 13.1 10/18/2017    A1C 9.8 08/09/2016    A1C 7.5 01/15/2016    A1C 13.4 06/03/2015     A1C tested: FAILED    Last LDL:    Lab Results   Component Value Date    CHOL 104 11/20/2017     Lab Results   Component Value Date    HDL 31 11/20/2017     Lab Results   Component Value Date    LDL 49 11/20/2017     Lab Results   Component Value Date    TRIG 118 11/20/2017     No results found for: CHOLHDLRATIO  Lab Results   Component Value Date    NHDL 73 11/20/2017       Is the patient on a Statin? YES             Is the patient on Aspirin? NO    Medications     HMG CoA Reductase Inhibitors    STATIN NOT PRESCRIBED, INTENTIONAL,          Last three blood pressure readings:  BP Readings from Last 3 Encounters:   11/20/17 126/82   11/09/17 125/89   11/07/17 129/81       Date of last diabetes office visit: 11/20/2017     Tobacco History:     History   Smoking Status     Never Smoker   Smokeless Tobacco     Never Used           Composite cancer screening  Chart review shows that this patient is due/due soon for the following None  Summary:    Patient is due/failing the following:   A1C    Action needed:   Patient needs office visit for Diabetic Check.    Type of outreach:    Phone, spoke to patient.  informed patient that she is due for an Diabetic check. Patient verbally understand. Pateint stated that she dosen't have any insurance right now but when she does get new insurance, she will call clinic to set up an appointment.    Questions for provider review:    None                                                                                                                                    Rula Wong MA       Chart routed to none .

## 2018-03-27 DIAGNOSIS — Z79.4 TYPE 2 DIABETES MELLITUS WITH HYPERGLYCEMIA, WITH LONG-TERM CURRENT USE OF INSULIN (H): ICD-10-CM

## 2018-03-27 DIAGNOSIS — E11.65 TYPE 2 DIABETES MELLITUS WITH HYPERGLYCEMIA, WITH LONG-TERM CURRENT USE OF INSULIN (H): ICD-10-CM

## 2018-03-27 RX ORDER — METFORMIN HCL 500 MG
500 TABLET, EXTENDED RELEASE 24 HR ORAL 2 TIMES DAILY WITH MEALS
Qty: 120 TABLET | Refills: 0 | Status: SHIPPED | OUTPATIENT
Start: 2018-03-27

## 2018-03-27 NOTE — TELEPHONE ENCOUNTER
Reason for Call:  Other prescription  metFORMIN (GLUCOPHAGE-XR) 500 MG 24 hr tablet    Detailed comments: Patient requesting refill of metFORMIN (GLUCOPHAGE-XR) 500 MG 24 hr tablet - [facility] send to [facility]; contact patient with questions.    Phone Number Patient can be reached at: Cell number on file:    Telephone Information:   Mobile [phone]       Best Time: ASAP    Can we leave a detailed message on this number? Yes    Call taken on 3/27/2018 at 10:09 AM by Radha Christianson

## 2018-03-27 NOTE — TELEPHONE ENCOUNTER
Pending Prescriptions:                       Disp   Refills    metFORMIN (GLUCOPHAGE-XR) 500 MG 24 hr ta*360 ta*0            Sig: Take 1 tablet (500 mg) by mouth 2 times daily           (with meals)    Routing refill request to provider for review/approval because:  Labs out of range:  Hgb A1C of 9.6 on 11/20/17.    Shobha Gillespie RN

## 2018-03-29 NOTE — TELEPHONE ENCOUNTER
Patient called and stated she does not have insurance right now and can not start the Trulicity because it is too much. Patient states she is taking the Lantus and is taking 15 Units. Patient states she is fine on the insulin for right now but does need a refill on the Metformin as soon as possible.   Thank you   Juliana

## 2018-03-29 NOTE — TELEPHONE ENCOUNTER
Patient has been informed declines to schedule a visit at this time due to insurance issues. Lorena Arcos MA

## 2018-07-24 ENCOUNTER — TELEPHONE (OUTPATIENT)
Dept: FAMILY MEDICINE | Facility: CLINIC | Age: 43
End: 2018-07-24

## 2018-07-24 NOTE — TELEPHONE ENCOUNTER
Panel Management Review      Patient has the following on her problem list:     Diabetes    ASA: Not Required     Last A1C  Lab Results   Component Value Date    A1C 9.6 11/20/2017    A1C 13.1 10/18/2017    A1C 9.8 08/09/2016    A1C 7.5 01/15/2016    A1C 13.4 06/03/2015     A1C tested: FAILED    Last LDL:    Lab Results   Component Value Date    CHOL 104 11/20/2017     Lab Results   Component Value Date    HDL 31 11/20/2017     Lab Results   Component Value Date    LDL 49 11/20/2017     Lab Results   Component Value Date    TRIG 118 11/20/2017     No results found for: CHOLHDLRATIO  Lab Results   Component Value Date    NHDL 73 11/20/2017       Is the patient on a Statin? NO             Is the patient on Aspirin? NO    Medications     HMG CoA Reductase Inhibitors    STATIN NOT PRESCRIBED, INTENTIONAL,          Last three blood pressure readings:  BP Readings from Last 3 Encounters:   11/20/17 126/82   11/09/17 125/89   11/07/17 129/81       Date of last diabetes office visit: 11/10/2017     Tobacco History:     History   Smoking Status     Never Smoker   Smokeless Tobacco     Never Used           Composite cancer screening  Chart review shows that this patient is due/due soon for the following Pap Smear  Summary:    Patient is due/failing the following:   A1C and PAP    Action needed:   Routed to provider for review.    Type of outreach:    Sent letter.    Questions for provider review:    None                                                                                                                                    Mark Cuellar       Chart routed to Care Team .

## 2018-07-24 NOTE — LETTER
July 24, 2018          Concha Castanon,  [address]  [place] [address]        Dear Concha Castanon      Monitoring and managing your preventative and chronic health conditions are very important to us. Our records indicate that you have not scheduled for Pap Smear, Diabetic Check and HgbA1C  which was recommended by Daniel Donaldson.       If you have received your health care elsewhere, please call the clinic so the information can be documented in your chart.    Please call [phone] or message us through your [employer] account to schedule an appointment or provide information for your chart.     Feel free to contact us if you have any questions or concerns!    I look forward to seeing you and working with you on your health care needs.     Sincerely,         Daniel Pina / Mark Cuellar

## 2018-08-07 ENCOUNTER — HEALTH MAINTENANCE LETTER (OUTPATIENT)
Age: 43
End: 2018-08-07

## 2018-11-08 ENCOUNTER — TELEPHONE (OUTPATIENT)
Dept: FAMILY MEDICINE | Facility: CLINIC | Age: 43
End: 2018-11-08

## 2018-11-12 ENCOUNTER — TELEPHONE (OUTPATIENT)
Dept: FAMILY MEDICINE | Facility: CLINIC | Age: 43
End: 2018-11-12

## 2018-11-12 NOTE — LETTER
November 12, 2018          Concha Castanon,  [address]  [place] [address]        Dear Concha Castanon      Monitoring and managing your preventative and chronic health conditions are very important to us. Our records indicate that you have not scheduled for Annual Female Exam and Diabetic Check  which was recommended by Dr. Pina.        If you have received your health care elsewhere, please call the clinic so the information can be documented in your chart.    Please call [phone] or message us through your [facility] account to schedule an appointment or provide information for your chart.     Feel free to contact us if you have any questions or concerns!    I look forward to seeing you and working with you on your health care needs.     Sincerely,       Your [facility] Team/sg

## 2018-11-12 NOTE — TELEPHONE ENCOUNTER
Panel Management Review      Patient has the following on her problem list:     Diabetes    ASA: Failed    Last A1C  Lab Results   Component Value Date    A1C 9.6 11/20/2017    A1C 13.1 10/18/2017    A1C 9.8 08/09/2016    A1C 7.5 01/15/2016    A1C 13.4 06/03/2015     A1C tested: FAILED    Last LDL:    Lab Results   Component Value Date    CHOL 104 11/20/2017     Lab Results   Component Value Date    HDL 31 11/20/2017     Lab Results   Component Value Date    LDL 49 11/20/2017     Lab Results   Component Value Date    TRIG 118 11/20/2017     No results found for: CHOLHDLRATIO  Lab Results   Component Value Date    NHDL 73 11/20/2017       Is the patient on a Statin? NO             Is the patient on Aspirin? NO    Medications     HMG CoA Reductase Inhibitors    STATIN NOT PRESCRIBED, INTENTIONAL,          Last three blood pressure readings:  BP Readings from Last 3 Encounters:   11/20/17 126/82   11/09/17 125/89   11/07/17 129/81       Date of last diabetes office visit: 11/20/17     Tobacco History:     History   Smoking Status     Never Smoker   Smokeless Tobacco     Never Used           Composite cancer screening  Chart review shows that this patient is due/due soon for the following Pap Smear  Summary:    Patient is due/failing the following:   A1C and PAP    Action needed:   Patient needs office visit for physical.    Type of outreach:    Sent letter.    Questions for provider review:    None                                                                                                                                    Lorena Arcos MA       Chart routed to none .

## 2019-12-15 NOTE — PROGRESS NOTES
Diabetes Self Management Training: Individual Review Visit    Concha Castanon presents today for education, evaluation of glucose control and modification of medication(s) related to Type 2 diabetes.    She is accompanied by self    Patient's diabetes management related comments/concerns: A1C is 13.1%,      Patient's emotional response to diabetes: expresses readiness to learn, anxiety, concern for health and well-being, acceptance and confidence diabetes can be controlled    Patient would like this visit to be focused around the following diabetes-related behaviors and goals: Diabetes pathophysiology, Assistance with making lifestyle changes, Self-care behavioral goal setting, Healthy Eating, Being Active, Monitoring, Taking Medication, Problem Solving, Reducing Risks and Healthy Coping    ASSESSMENT:  Patient Problem List and Family Medical History reviewed for relevant medical history, current medical status, and diabetes risk factors.    Current Diabetes Management per Patient:  Taking diabetes medications?   yes:     Diabetes Medication(s)     Biguanides Sig    metFORMIN (GLUCOPHAGE-XR) 500 MG 24 hr tablet Take 2 tablets (1,000 mg) by mouth 2 times daily (with meals)    Insulin Sig    insulin glargine (LANTUS SOLOSTAR) 100 UNIT/ML injection Inject 37 Units Subcutaneous At Bedtime      , Oral Medications: Metformin - Dose:  mg takes 1000 mg , Time: breakfast and supper Lantus 37 units at bedtime.      *Abbreviated insulin dose documentation key: Insulin Trade Name (qxwuynmyk-hiqrk-fzzglh-bedtime) - i.e. Humalog 5-5-5-0 (Humalog 5 units at breakfast, 5 units at lunch, and 5 units at dinner).    Past Diabetes Education: Yes    Patient glucose self monitoring as follows: 3 times daily.   BG meter: Contour Next EZ meter  BG results: see blood glucose log attached to this encounter     BG values are: Not in goal  Patient's most recent   Lab Results   Component Value Date    A1C 13.1 10/18/2017    is not  "meeting goal of <7.0    Nutrition:  Patient eats 3 meals per day    Breakfast - 2 slices of sour dough bread, and cheese and cold cuts and rocha and cup of tea or 2 slices of sprout bread and cheese and cold cuts eggs and webber and tea  Lunch - Nuts and glycerna or cup of brown rice and cabbage and chicken stew and stir clayton veggies  Dinner 2 servings salad and chicken and croutons and dressing or rice and cabbage stew with chicken and stir clayton veggies  Snacks - nuts, loves bananas and loves smoothies.      Beverages: tea or coffee, water all day.      Cultural/Latter day diet restrictions: No     Biggest Challenge to Healthy Eating: knowing what to eat    Physical Activity:    Type: goes to the gym and treadmill and maintain hrt rate at 144    Diabetes Risk Factors:  family history, age over 45 years, ethnicity and history of gestational diabetes    Diabetes Complications:  Not discussed today.    Vitals:  LMP 09/19/2017 (Approximate)  Estimated body mass index is 31.96 kg/(m^2) as calculated from the following:    Height as of 10/19/17: 1.676 m (5' 6\").    Weight as of 10/19/17: 89.8 kg (198 lb).   Last 3 BP:   BP Readings from Last 3 Encounters:   10/19/17 129/87   10/18/17 118/82       History   Smoking Status     Never Smoker   Smokeless Tobacco     Never Used       Labs:  Lab Results   Component Value Date    A1C 13.1 10/18/2017     Lab Results   Component Value Date     10/18/2017     Lab Results   Component Value Date    LDL 79 10/18/2017    LDL 77 06/03/2015     HDL Cholesterol   Date Value Ref Range Status   06/03/2015 36 (L) >40 mg/dL Final   ]  GFR Estimate   Date Value Ref Range Status   10/18/2017 81 >60 mL/min/1.7m2 Final     Comment:     Non  GFR Calc     GFR Estimate If Black   Date Value Ref Range Status   10/18/2017 >90 >60 mL/min/1.7m2 Final     Comment:      GFR Calc     Lab Results   Component Value Date    CR 0.79 10/18/2017     No results found for: " MICROALBUMIN    Socio/Economic Considerations:    Support system: family and spouse/significant other    Health Beliefs and Attitudes:   Patient Activation Measure Survey Score:  RADHA Score (Last Two) 10/19/2017   RADHA Raw Score 40   Activation Score 100   RADHA Level 4       Stage of Change: PREPARATION (Decided to change - considering how)      Diabetes knowledge and skills assessment:     Patient is knowledgeable in diabetes management concepts related to: Healthy Eating, Being Active, Monitoring and Taking Medication    Patient needs further education on the following diabetes management concepts: Healthy Eating, Being Active, Monitoring and Taking Medication    Barriers to Learning Assessment: No Barriers identified    Based on learning assessment above, most appropriate setting for further diabetes education would be: Individual setting.    INTERVENTION:   Education provided today on:  AADE Self-Care Behaviors:  Healthy Eating: carbohydrate counting, consistency in amount, composition, and timing of food intake, weight reduction, heart healthy diet, eating out, portion control, plate planning method and label reading  Being Active: relationship to blood glucose and describe appropriate activity program  Monitoring: purpose, proper technique, log and interpret results, individual blood glucose targets and frequency of monitoring  Taking Medication: action of prescribed medication, drawing up, administering and storing injectable diabetes medications, proper site selection and rotation for injections, side effects of prescribed medications and when to take medications    Opportunities for ongoing education and support in diabetes-self management were discussed.    Pt verbalized understanding of concepts discussed and recommendations provided today.       Education Materials Provided:  Veyo Understanding Diabetes Booklet, Carbohydrate Counting and Medication Information on Victoza    PLAN:  See Patient  Instructions for co-developed, patient-stated behavior change goals.  AVS printed and provided to patient today.    FOLLOW-UP:  Follow-up appointment scheduled on Nov. 30.  Chart routed to referring provider.    Ongoing plan for education and support: Follow-up visit with diabetes educator in [place] Mehta RN/SEN Oliver Diabetes Educator    Time Spent: 60 minutes  Encounter Type: Individual    Any diabetes medication dose changes were made via the CDE Protocol and Collaborative Practice Agreement with the patient's primary care provider. A copy of this encounter was shared with the provider.     131

## 2025-05-19 NOTE — MR AVS SNAPSHOT
After Visit Summary   11/9/2017    Concha Castanon    MRN: [id number]           Patient Information     Date Of Birth          1975        Visit Information        Provider Department      11/9/2017 10:20 AM Alondra Perez PA-C [facility]        Today's Diagnoses     Gallstones    -  1    Controlled type 2 diabetes mellitus with other ophthalmic complication, with long-term current use of insulin (H)        Need for prophylactic vaccination and inoculation against influenza        Fatty liver           Follow-ups after your visit        Additional Services     GENERAL SURG ADULT REFERRAL       Your provider has referred you to: [facility]: [facility] [phone]   [email or web address]    Please be aware that coverage of these services is subject to the terms and limitations of your health insurance plan.  Call member services at your health plan with any benefit or coverage questions.      Please bring the following with you to your appointment:    (1) Any X-Rays, CTs or MRIs which have been performed.  Contact the facility where they were done to arrange for  prior to your scheduled appointment.   (2) List of current medications   (3) This referral request   (4) Any documents/labs given to you for this referral                  Your next 10 appointments already scheduled     Nov 20, 2017 10:00 AM CST   Office Visit with Dnaiel Pina MD   [facility] ([place] [id number] [facility] [address]   [phone]           Bring a current list of meds and any records pertaining to this visit. For Physicals, please bring immunization records and any forms needing to be filled out. Please arrive 10 minutes early to complete paperwork.            Nov 30, 2017  9:30 AM CST   Diabetic Education with AN DIABETIC ED RESOURCE   [facility])     "[id number] [facility] [address] [phone]              Who to contact     If you have questions or need follow up information about today's clinic visit or your schedule please contact [facility] directly at [phone].  Normal or non-critical lab and imaging results will be communicated to you by MyChart, letter or phone within 4 business days after the clinic has received the results. If you do not hear from us within 7 days, please contact the clinic through MyChart or phone. If you have a critical or abnormal lab result, we will notify you by phone as soon as possible.  Submit refill requests through [employer] or call your pharmacy and they will forward the refill request to us. Please allow 3 business days for your refill to be completed.          Additional Information About Your Visit        Care EveryWhere ID     This is your Care EveryWhere ID. This could be used by other organizations to access your [place] medical records  [id number]        Your Vitals Were     Pulse Temperature Height Last Period Pulse Oximetry Breastfeeding?    100 99.1  F (37.3  C) (Oral) 5' 6\" (1.676 m) 09/19/2017 (Approximate) 99% No    BMI (Body Mass Index)                   32.44 kg/m2            Blood Pressure from Last 3 Encounters:   11/09/17 125/89   11/07/17 129/81   10/19/17 129/87    Weight from Last 3 Encounters:   11/09/17 201 lb (91.2 kg)   11/07/17 200 lb 3.2 oz (90.8 kg)   10/19/17 198 lb (89.8 kg)              We Performed the Following     FLU VAC, SPLIT VIRUS IM > 3 YO (QUADRIVALENT) [04895]     GENERAL SURG ADULT REFERRAL     Vaccine Administration, Initial [08745]          Today's Medication Changes          These changes are accurate as of: 11/9/17 10:48 AM.  If you have any questions, ask your nurse or doctor.               Start taking these medicines.        Dose/Directions    ACE/ARB/ARNI NOT PRESCRIBED (INTENTIONAL)   Used for:  Controlled type 2 diabetes mellitus with other " ophthalmic complication, with long-term current use of insulin (H)   Started by:  Alondra Perez PA-C        Please choose reason not prescribed, below   Refills:  0       STATIN NOT PRESCRIBED (INTENTIONAL)   Used for:  Controlled type 2 diabetes mellitus with other ophthalmic complication, with long-term current use of insulin (H)   Started by:  Alondra Perez PA-C        Please choose reason not prescribed, below   Refills:  0            Where to get your medicines      Some of these will need a paper prescription and others can be bought over the counter.  Ask your nurse if you have questions.     You don't need a prescription for these medications     ACE/ARB/ARNI NOT PRESCRIBED (INTENTIONAL)    STATIN NOT PRESCRIBED (INTENTIONAL)                Primary Care Provider Office Phone # Fax #    Daniel Pina [phone] [address] [facility] [address]        Equal Access to Services     [facility]: Hadii ramesh olivao Soxin, waaxda luqadaha, qaybta kaalmada adenoel, shaista brown . So [facility] [phone].    ATENCIÓN: Si habla español, tiene a easton disposición servicios gratuitos de asistencia lingüística. Llame al [phone].    We comply with applicable federal civil rights laws and [place] laws. We do not discriminate on the basis of race, color, national origin, age, disability, sex, sexual orientation, or gender identity.            Thank you!     Thank you for choosing [facility]  for your care. Our goal is always to provide you with excellent care. Hearing back from our patients is one way we can continue to improve our services. Please take a few minutes to complete the written survey that you may receive in the mail after your visit with us. Thank you!             Your Updated Medication List - Protect others around you: Learn how to safely use, store and throw away your medicines at  www.disposemymeds.org.          This list is accurate as of: 11/9/17 10:48 AM.  Always use your most recent med list.                   Brand Name Dispense Instructions for use Diagnosis    ACE/ARB/ARNI NOT PRESCRIBED (INTENTIONAL)      Please choose reason not prescribed, below    Controlled type 2 diabetes mellitus with other ophthalmic complication, with long-term current use of insulin (H)       blood glucose calibration solution     1 Bottle    Use to calibrate blood glucose monitor as directed.    Type 2 diabetes mellitus with hyperglycemia, with long-term current use of insulin (H)       blood glucose lancets standard    no brand specified    100 each    Check blood sugar x 3 daily    Type 2 diabetes mellitus with hyperglycemia, with long-term current use of insulin (H)       * blood glucose monitoring meter device kit    no brand specified    1 kit    Check blood sugar x 3 daily    Type 2 diabetes mellitus with hyperglycemia, with long-term current use of insulin (H)       * blood glucose monitoring meter device kit     1 kit    Use to test blood sugars 3 times daily or as directed.    Type 2 diabetes mellitus with hyperglycemia, with long-term current use of insulin (H)       blood glucose monitoring test strip    no brand specified    100 strip    Check blood sugar x 3 daily    Type 2 diabetes mellitus with hyperglycemia, with long-term current use of insulin (H)       ciprofloxacin 250 MG tablet    CIPRO    6 tablet    Take 1 tablet (250 mg) by mouth 2 times daily    Dysuria       dulaglutide 0.75 MG/0.5ML pen    TRULICITY    2 mL    Inject 0.75 mg Subcutaneous every 7 days    Diabetes mellitus, type 2 (H)       ibuprofen 600 MG tablet    ADVIL/MOTRIN    15 tablet    Take 1 tablet (600 mg) by mouth every 8 hours as needed for moderate pain    Type 2 diabetes mellitus with hyperglycemia, with long-term current use of insulin (H)       insulin glargine 100 UNIT/ML injection    LANTUS SOLOSTAR    15 mL     Inject 37 Units Subcutaneous At Bedtime    Type 2 diabetes mellitus with hyperglycemia, with long-term current use of insulin (H)       insulin pen needle 32G X 4 MM    BD KAHLIL U/F    100 each    Use 1 daily as directed.    Diabetes mellitus, type 2 (H)       IRON PO           liraglutide 18 MG/3ML soln    VICTOZA    6 mL    Inject 1.2 mg Subcutaneous daily    Diabetes mellitus, type 2 (H)       metFORMIN 500 MG 24 hr tablet    GLUCOPHAGE-XR    180 tablet    Take 2 tablets (1,000 mg) by mouth 2 times daily (with meals)    Type 2 diabetes mellitus with hyperglycemia, with long-term current use of insulin (H)       STATIN NOT PRESCRIBED (INTENTIONAL)      Please choose reason not prescribed, below    Controlled type 2 diabetes mellitus with other ophthalmic complication, with long-term current use of insulin (H)       * Notice:  This list has 2 medication(s) that are the same as other medications prescribed for you. Read the directions carefully, and ask your doctor or other care provider to review them with you.       Home